# Patient Record
Sex: MALE | Race: WHITE | NOT HISPANIC OR LATINO
[De-identification: names, ages, dates, MRNs, and addresses within clinical notes are randomized per-mention and may not be internally consistent; named-entity substitution may affect disease eponyms.]

---

## 2021-04-12 ENCOUNTER — TRANSCRIPTION ENCOUNTER (OUTPATIENT)
Age: 71
End: 2021-04-12

## 2023-01-31 NOTE — H&P ADULT - ASSESSMENT
72yr old male PMH HTN, CAD, HLD, MI, with c/o SOB on exertion pt. had a cardiac PET-MPI which revealed a moderate to large perfusion abnormality of severe severity is present in the basal anterior mid anterior regions on the stress images, defect is largely reversible. Pt. now referred for [facility] for further evaluation.   Consent obtained for and signed for cardiac cath with coronary angiogram and possible stent placement with possible sedation and analgesia. [facility] risks, benefits and alternatives discussed with patient. Risk discussed included, but not limited to MI, stroke, mortality, major bleeding, arrythmia, or infection, educational material provided. Pt. verbalizes and understands pre-procedural instructions.   ASA:  Bleeding Risk Score:  Creatinine:  GFR:    Pako Score:  Pt. pre-hydrated with 0.9% sodium chloride 250cc bolus IV x1 72yr old male PMH HTN, CAD, HLD, MI, with c/o SOB on exertion pt. had a cardiac PET-MPI which revealed a moderate to large perfusion abnormality of severe severity is present in the basal anterior mid anterior regions on the stress images, defect is largely reversible. Pt. now referred for [facility] for further evaluation.   Consent obtained for and signed for cardiac cath with coronary angiogram and possible stent placement with possible sedation and analgesia. [facility] risks, benefits and alternatives discussed with patient. Risk discussed included, but not limited to MI, stroke, mortality, major bleeding, arrythmia, or infection, educational material provided. Pt. verbalizes and understands pre-procedural instructions.   ASA: II  Bleeding Risk Score: 1.2  Creatinine: 1.27  GFR:  61  Pako Score: DWAYNE risk 1 point  Pt. pre-hydrated with 0.9% sodium chloride 250cc bolus IV x1

## 2023-01-31 NOTE — ASU PATIENT PROFILE, ADULT - FALL HARM RISK - UNIVERSAL INTERVENTIONS
Bed in lowest position, wheels locked, appropriate side rails in place/Call bell, personal items and telephone in reach/Instruct patient to call for assistance before getting out of bed or chair/Non-slip footwear when patient is out of bed/[place] to call system/Physically safe environment - no spills, clutter or unnecessary equipment/Purposeful Proactive Rounding/Room/bathroom lighting operational, light cord in reach

## 2023-01-31 NOTE — H&P ADULT - NSHPLABSRESULTS_GEN_ALL_CORE
Cardiac PET-MPI a moderate to large perfusion abnormality of severe severity is present in the basal anterior mid anterior regions on the stress images. The defect is largely reversible.

## 2023-01-31 NOTE — ASU PATIENT PROFILE, ADULT - NSICDXPASTMEDICALHX_GEN_ALL_CORE_FT
PAST MEDICAL HISTORY:  Acute myocardial infarction     CAD (coronary artery disease)     HLD (hyperlipidemia)     HTN (hypertension)

## 2023-01-31 NOTE — H&P ADULT - NSHPREVIEWOFSYSTEMS_GEN_ALL_CORE
CONSTITUTIONAL: No fever, weight loss, or fatigue  EYES: No eye pain, visual disturbances, or discharge  ENMT:  No difficulty hearing, tinnitus, vertigo; No sinus or throat pain  NECK: No pain or stiffness  BREASTS: No pain, masses, or nipple discharge  RESPIRATORY:+ shortness of breath on exertion  CARDIOVASCULAR: No chest pain, palpitations, dizziness, or leg swelling  GASTROINTESTINAL: No abdominal or epigastric pain. No nausea, vomiting, or hematemesis; No diarrhea or constipation. No melena or hematochezia.  GENITOURINARY: No dysuria, frequency, hematuria, or incontinence  NEUROLOGICAL: No headaches, memory loss, loss of strength, numbness, or tremors  SKIN: No itching, burning, rashes, or lesions   ENDOCRINE: No heat or cold intolerance; No hair loss  MUSCULOSKELETAL: No joint pain or swelling; No muscle, back, or extremity pain  PSYCHIATRIC: No depression, anxiety, mood swings, or difficulty sleeping

## 2023-01-31 NOTE — H&P ADULT - HISTORY OF PRESENT ILLNESS
72yr old male PMH HTN, CAD, HLD, MI,  72yr old male PMH HTN, CAD, HLD, MI, with c/o SOB on exertion pt. had a cardiac PET-MPI which revealed a moderate to large perfusion abnormality of severe severity is present in the basal anterior mid anterior regions on the stress images, defect is largely reversible. Pt. now referred for [facility] for further evaluation.

## 2023-02-01 ENCOUNTER — INPATIENT (INPATIENT)
Facility: HOSPITAL | Age: 73
LOS: 7 days | Discharge: HOME HEALTH | DRG: 235 | End: 2023-02-09
Attending: THORACIC SURGERY (CARDIOTHORACIC VASCULAR SURGERY) | Admitting: THORACIC SURGERY (CARDIOTHORACIC VASCULAR SURGERY)
Payer: MEDICARE

## 2023-02-01 ENCOUNTER — OUTPATIENT (OUTPATIENT)
Dept: OUTPATIENT SERVICES | Facility: HOSPITAL | Age: 73
LOS: 1 days | Discharge: ANOTHER HOSPITAL | End: 2023-02-01
Payer: MEDICARE

## 2023-02-01 ENCOUNTER — TRANSCRIPTION ENCOUNTER (OUTPATIENT)
Age: 73
End: 2023-02-01

## 2023-02-01 VITALS
HEART RATE: 64 BPM | DIASTOLIC BLOOD PRESSURE: 70 MMHG | OXYGEN SATURATION: 98 % | RESPIRATION RATE: 16 BRPM | SYSTOLIC BLOOD PRESSURE: 143 MMHG

## 2023-02-01 VITALS
DIASTOLIC BLOOD PRESSURE: 88 MMHG | OXYGEN SATURATION: 98 % | TEMPERATURE: 98 F | HEART RATE: 62 BPM | RESPIRATION RATE: 15 BRPM | SYSTOLIC BLOOD PRESSURE: 148 MMHG

## 2023-02-01 VITALS
HEIGHT: 67 IN | OXYGEN SATURATION: 99 % | TEMPERATURE: 97 F | WEIGHT: 164.91 LBS | RESPIRATION RATE: 16 BRPM | DIASTOLIC BLOOD PRESSURE: 68 MMHG | SYSTOLIC BLOOD PRESSURE: 151 MMHG | HEART RATE: 63 BPM

## 2023-02-01 DIAGNOSIS — I65.29 OCCLUSION AND STENOSIS OF UNSPECIFIED CAROTID ARTERY: ICD-10-CM

## 2023-02-01 DIAGNOSIS — I25.10 ATHEROSCLEROTIC HEART DISEASE OF NATIVE CORONARY ARTERY WITHOUT ANGINA PECTORIS: ICD-10-CM

## 2023-02-01 DIAGNOSIS — R94.39 ABNORMAL RESULT OF OTHER CARDIOVASCULAR FUNCTION STUDY: ICD-10-CM

## 2023-02-01 DIAGNOSIS — I10 ESSENTIAL (PRIMARY) HYPERTENSION: ICD-10-CM

## 2023-02-01 DIAGNOSIS — E78.5 HYPERLIPIDEMIA, UNSPECIFIED: ICD-10-CM

## 2023-02-01 PROBLEM — Z00.00 ENCOUNTER FOR PREVENTIVE HEALTH EXAMINATION: Status: ACTIVE | Noted: 2023-02-01

## 2023-02-01 LAB
A1C WITH ESTIMATED AVERAGE GLUCOSE RESULT: 6.3 % — HIGH (ref 4–5.6)
ALBUMIN SERPL ELPH-MCNC: 4 G/DL — SIGNIFICANT CHANGE UP (ref 3.3–5.2)
ALP SERPL-CCNC: 79 U/L — SIGNIFICANT CHANGE UP (ref 40–120)
ALT FLD-CCNC: 15 U/L — SIGNIFICANT CHANGE UP
ANION GAP SERPL CALC-SCNC: 8 MMOL/L — SIGNIFICANT CHANGE UP (ref 5–17)
APTT BLD: 30.8 SEC — SIGNIFICANT CHANGE UP (ref 27.5–35.5)
AST SERPL-CCNC: 15 U/L — SIGNIFICANT CHANGE UP
BASOPHILS # BLD AUTO: 0.09 K/UL — SIGNIFICANT CHANGE UP (ref 0–0.2)
BASOPHILS NFR BLD AUTO: 1 % — SIGNIFICANT CHANGE UP (ref 0–2)
BILIRUB SERPL-MCNC: 0.4 MG/DL — SIGNIFICANT CHANGE UP (ref 0.4–2)
BLD GP AB SCN SERPL QL: SIGNIFICANT CHANGE UP
BUN SERPL-MCNC: 22.2 MG/DL — HIGH (ref 8–20)
CALCIUM SERPL-MCNC: 9.2 MG/DL — SIGNIFICANT CHANGE UP (ref 8.4–10.5)
CHLORIDE SERPL-SCNC: 106 MMOL/L — SIGNIFICANT CHANGE UP (ref 96–108)
CO2 SERPL-SCNC: 26 MMOL/L — SIGNIFICANT CHANGE UP (ref 22–29)
CREAT SERPL-MCNC: 1.13 MG/DL — SIGNIFICANT CHANGE UP (ref 0.5–1.3)
EGFR: 70 ML/MIN/1.73M2 — SIGNIFICANT CHANGE UP
EOSINOPHIL # BLD AUTO: 0.13 K/UL — SIGNIFICANT CHANGE UP (ref 0–0.5)
EOSINOPHIL NFR BLD AUTO: 1.5 % — SIGNIFICANT CHANGE UP (ref 0–6)
ESTIMATED AVERAGE GLUCOSE: 134 MG/DL — HIGH (ref 68–114)
GLUCOSE SERPL-MCNC: 102 MG/DL — HIGH (ref 70–99)
HCT VFR BLD CALC: 37.2 % — LOW (ref 39–50)
HGB BLD-MCNC: 12.8 G/DL — LOW (ref 13–17)
IMM GRANULOCYTES NFR BLD AUTO: 0.3 % — SIGNIFICANT CHANGE UP (ref 0–0.9)
INR BLD: 1.02 RATIO — SIGNIFICANT CHANGE UP (ref 0.88–1.16)
LYMPHOCYTES # BLD AUTO: 2.32 K/UL — SIGNIFICANT CHANGE UP (ref 1–3.3)
LYMPHOCYTES # BLD AUTO: 26.2 % — SIGNIFICANT CHANGE UP (ref 13–44)
MCHC RBC-ENTMCNC: 31.2 PG — SIGNIFICANT CHANGE UP (ref 27–34)
MCHC RBC-ENTMCNC: 34.4 GM/DL — SIGNIFICANT CHANGE UP (ref 32–36)
MCV RBC AUTO: 90.7 FL — SIGNIFICANT CHANGE UP (ref 80–100)
MONOCYTES # BLD AUTO: 0.75 K/UL — SIGNIFICANT CHANGE UP (ref 0–0.9)
MONOCYTES NFR BLD AUTO: 8.5 % — SIGNIFICANT CHANGE UP (ref 2–14)
NEUTROPHILS # BLD AUTO: 5.52 K/UL — SIGNIFICANT CHANGE UP (ref 1.8–7.4)
NEUTROPHILS NFR BLD AUTO: 62.5 % — SIGNIFICANT CHANGE UP (ref 43–77)
NT-PROBNP SERPL-SCNC: 105 PG/ML — SIGNIFICANT CHANGE UP (ref 0–300)
PA ADP PRP-ACNC: 157 PRU — LOW (ref 180–376)
PLATELET # BLD AUTO: 260 K/UL — SIGNIFICANT CHANGE UP (ref 150–400)
POTASSIUM SERPL-MCNC: 4.7 MMOL/L — SIGNIFICANT CHANGE UP (ref 3.5–5.3)
POTASSIUM SERPL-SCNC: 4.7 MMOL/L — SIGNIFICANT CHANGE UP (ref 3.5–5.3)
PREALB SERPL-MCNC: 29 MG/DL — SIGNIFICANT CHANGE UP (ref 18–38)
PROT SERPL-MCNC: 6.4 G/DL — LOW (ref 6.6–8.7)
PROTHROM AB SERPL-ACNC: 11.8 SEC — SIGNIFICANT CHANGE UP (ref 10.5–13.4)
RBC # BLD: 4.1 M/UL — LOW (ref 4.2–5.8)
RBC # FLD: 13.2 % — SIGNIFICANT CHANGE UP (ref 10.3–14.5)
SODIUM SERPL-SCNC: 140 MMOL/L — SIGNIFICANT CHANGE UP (ref 135–145)
TSH SERPL-MCNC: 1.13 UIU/ML — SIGNIFICANT CHANGE UP (ref 0.27–4.2)
WBC # BLD: 8.84 K/UL — SIGNIFICANT CHANGE UP (ref 3.8–10.5)
WBC # FLD AUTO: 8.84 K/UL — SIGNIFICANT CHANGE UP (ref 3.8–10.5)

## 2023-02-01 PROCEDURE — 71045 X-RAY EXAM CHEST 1 VIEW: CPT | Mod: 26

## 2023-02-01 PROCEDURE — C1760: CPT

## 2023-02-01 PROCEDURE — 93306 TTE W/DOPPLER COMPLETE: CPT | Mod: 26

## 2023-02-01 PROCEDURE — 93454 CORONARY ARTERY ANGIO S&I: CPT

## 2023-02-01 PROCEDURE — 99222 1ST HOSP IP/OBS MODERATE 55: CPT | Mod: FS

## 2023-02-01 PROCEDURE — 93010 ELECTROCARDIOGRAM REPORT: CPT | Mod: 77

## 2023-02-01 PROCEDURE — 93880 EXTRACRANIAL BILAT STUDY: CPT | Mod: 26

## 2023-02-01 PROCEDURE — C1887: CPT

## 2023-02-01 PROCEDURE — 93005 ELECTROCARDIOGRAM TRACING: CPT | Mod: XU

## 2023-02-01 PROCEDURE — C1894: CPT

## 2023-02-01 PROCEDURE — C1769: CPT

## 2023-02-01 PROCEDURE — 93010 ELECTROCARDIOGRAM REPORT: CPT

## 2023-02-01 RX ORDER — CLOPIDOGREL BISULFATE 75 MG/1
1 TABLET, FILM COATED ORAL
Qty: 0 | Refills: 0 | DISCHARGE

## 2023-02-01 RX ORDER — SODIUM CHLORIDE 9 MG/ML
3 INJECTION INTRAMUSCULAR; INTRAVENOUS; SUBCUTANEOUS EVERY 8 HOURS
Refills: 0 | Status: DISCONTINUED | OUTPATIENT
Start: 2023-02-01 | End: 2023-02-04

## 2023-02-01 RX ORDER — METOPROLOL TARTRATE 50 MG
12.5 TABLET ORAL
Refills: 0 | Status: DISCONTINUED | OUTPATIENT
Start: 2023-02-01 | End: 2023-02-04

## 2023-02-01 RX ORDER — SODIUM CHLORIDE 9 MG/ML
250 INJECTION INTRAMUSCULAR; INTRAVENOUS; SUBCUTANEOUS ONCE
Refills: 0 | Status: COMPLETED | OUTPATIENT
Start: 2023-02-01 | End: 2023-02-01

## 2023-02-01 RX ORDER — CHLORHEXIDINE GLUCONATE 213 G/1000ML
15 SOLUTION TOPICAL
Refills: 0 | Status: DISCONTINUED | OUTPATIENT
Start: 2023-02-01 | End: 2023-02-04

## 2023-02-01 RX ORDER — ATORVASTATIN CALCIUM 80 MG/1
80 TABLET, FILM COATED ORAL AT BEDTIME
Refills: 0 | Status: DISCONTINUED | OUTPATIENT
Start: 2023-02-01 | End: 2023-02-04

## 2023-02-01 RX ORDER — PANTOPRAZOLE SODIUM 20 MG/1
40 TABLET, DELAYED RELEASE ORAL
Refills: 0 | Status: DISCONTINUED | OUTPATIENT
Start: 2023-02-01 | End: 2023-02-04

## 2023-02-01 RX ORDER — DEXTROSE MONOHYDRATE, SODIUM CHLORIDE, AND POTASSIUM CHLORIDE 50; .745; 4.5 G/1000ML; G/1000ML; G/1000ML
1000 INJECTION, SOLUTION INTRAVENOUS
Refills: 0 | Status: DISCONTINUED | OUTPATIENT
Start: 2023-02-01 | End: 2023-02-01

## 2023-02-01 RX ORDER — SODIUM CHLORIDE 9 MG/ML
1000 INJECTION INTRAMUSCULAR; INTRAVENOUS; SUBCUTANEOUS
Refills: 0 | Status: DISCONTINUED | OUTPATIENT
Start: 2023-02-01 | End: 2023-02-01

## 2023-02-01 RX ORDER — CHLORHEXIDINE GLUCONATE 213 G/1000ML
1 SOLUTION TOPICAL
Refills: 0 | Status: DISCONTINUED | OUTPATIENT
Start: 2023-02-01 | End: 2023-02-04

## 2023-02-01 RX ADMIN — PANTOPRAZOLE SODIUM 40 MILLIGRAM(S): 20 TABLET, DELAYED RELEASE ORAL at 17:42

## 2023-02-01 RX ADMIN — CHLORHEXIDINE GLUCONATE 1 APPLICATION(S): 213 SOLUTION TOPICAL at 18:13

## 2023-02-01 RX ADMIN — SODIUM CHLORIDE 500 MILLILITER(S): 9 INJECTION INTRAMUSCULAR; INTRAVENOUS; SUBCUTANEOUS at 07:25

## 2023-02-01 RX ADMIN — CHLORHEXIDINE GLUCONATE 15 MILLILITER(S): 213 SOLUTION TOPICAL at 17:41

## 2023-02-01 RX ADMIN — ATORVASTATIN CALCIUM 80 MILLIGRAM(S): 80 TABLET, FILM COATED ORAL at 22:05

## 2023-02-01 RX ADMIN — Medication 12.5 MILLIGRAM(S): at 17:42

## 2023-02-01 RX ADMIN — SODIUM CHLORIDE 148 MILLILITER(S): 9 INJECTION INTRAMUSCULAR; INTRAVENOUS; SUBCUTANEOUS at 10:00

## 2023-02-01 RX ADMIN — SODIUM CHLORIDE 3 MILLILITER(S): 9 INJECTION INTRAMUSCULAR; INTRAVENOUS; SUBCUTANEOUS at 20:33

## 2023-02-01 RX ADMIN — SODIUM CHLORIDE 3 MILLILITER(S): 9 INJECTION INTRAMUSCULAR; INTRAVENOUS; SUBCUTANEOUS at 16:59

## 2023-02-01 NOTE — PROGRESS NOTE ADULT - SUBJECTIVE AND OBJECTIVE BOX
s/p LHC Denies chest pain, shortness of breath, dizziness or palpitations post procedure    PHYSICAL EXAM:    Constitutional: NAD  Neuro: Alert and oriented x 3 Speech clear No focal deficits  Neck: No JVD   Respiratory: CTAB  Cardiovascular: S1 and S2, RRR,   Gastrointestinal: BS+, soft, NT/ND  Extremities: No clubbing cyanosis or edema No varicosities  Vascular: 2+ peripheral pulses  Psychiatric: Normal mood, normal affect  Musculoskeletal: 5/5 strength b/l upper and lower extremities  Right groin procedure site with Mynx ;no bleeding, no hematoma, site soft, non tender, positive pedal pulses bilaterally      T(C): 36.2 (01 Feb 2023 06:48), Max: 36.2 (01 Feb 2023 06:48)  T(F): 97.1 (01 Feb 2023 06:48), Max: 97.1 (01 Feb 2023 06:48)  HR: 64 (01 Feb 2023 10:15) (63 - 65)  BP: 143/70 (01 Feb 2023 10:15) (141/99 - 151/73)  RR: 16 (01 Feb 2023 10:15) (16 - 16)  SpO2: 98% (01 Feb 2023 10:15) (98% - 99%)    Parameters below as of 01 Feb 2023 10:45  Patient On (Oxygen Delivery Method): room air        HPI:  72yr old male PMH HTN, CAD, HLD, MI, with c/o SOB on exertion pt. had a cardiac PET-MPI which revealed a moderate to large perfusion abnormality of severe severity is present in the basal anterior mid anterior regions on the stress images, defect is largely reversible. Pt. now referred for [facility] for further evaluation.  (31 Jan 2023 14:14)    now s/p [facility] revealing LM, LCX and LAD stenoses  Transfer to [facility] for CTS evalution/CABG by Dr Doe    -iv hydration for DWAYNE prevention  -encourage PO fluids  -plan of care discussed with patient and MD  -d/c after bedrest if stable  -post procedure instructions reviewed  -follow up with MD in 1-2 weeks  -Discussed therapeutic lifestyle changes to reduce risk factors such as following a cardiac diet, weight loss, maintaining a healthy weight, exercise, smoking cessation, medication compliance, and regular follow-up  with MD

## 2023-02-01 NOTE — H&P ADULT - HISTORY OF PRESENT ILLNESS
72yr old male PMH HTN, CAD (s/p MI in 2016 with PCI to d RCA), HLD,   Pt reports SOB on exertion, for which he underwent a cardiac PET-MPI that revealed a moderate to large perfusion abnormality in the basal anterior mid anterior regions on the stress images,   He underwent elective cardiac cath today at [facility] that showed Triple vessel CAD (85% Mlad, 90% d1, 75% Ostial Lcx, and 65%LM .  Patent dRCA stent).  Patient was transferred to [facility] for sugical evaluation.

## 2023-02-01 NOTE — DISCHARGE NOTE PROVIDER - HOSPITAL COURSE
72yr old male PMH HTN, CAD, HLD, MI, with c/o SOB on exertion pt. had a cardiac PET-MPI which revealed a moderate to large perfusion abnormality of severe severity is present in the basal anterior mid anterior regions on the stress images, defect is largely reversible.  S/P LHC which revealed LM, LAD and LCx stenoses. Pt will be transferred to [facility] for CTS evaluation for CABG by Dr Doe     T(C): 36.2 (01 Feb 2023 06:48), Max: 36.2 (01 Feb 2023 06:48)  T(F): 97.1 (01 Feb 2023 06:48), Max: 97.1 (01 Feb 2023 06:48)  HR: 63 (01 Feb 2023 09:45) (63 - 65)  BP: 144/71 (01 Feb 2023 09:45) (141/99 - 151/73)  BP(mean): --  RR: 16 (01 Feb 2023 09:45) (16 - 16)  SpO2: 99% (01 Feb 2023 09:45) (98% - 99%)    Parameters below as of 01 Feb 2023 09:45  Patient On (Oxygen Delivery Method): room air

## 2023-02-01 NOTE — DISCHARGE NOTE PROVIDER - CARE PROVIDER_API CALL
Isaias Berrios (MD)  Cardiovascular Disease; Interventional Cardiology  [address] [place] [address]  Phone: [phone]  Fax: [phone]  Follow Up Time: 2 weeks

## 2023-02-01 NOTE — H&P ADULT - ASSESSMENT
72yr old male PMH HTN, CAD (s/p MI in 2016 with PCI to d RCA), HLD,   Pt reports SOB on exertion, for which he underwent a cardiac PET-MPI that revealed a moderate to large perfusion abnormality in the basal anterior mid anterior regions on the stress images,   He underwent elective cardiac cath today at [facility] that showed Triple vessel CAD (85% Mlad, 90% d1, 75% Ostial Lcx, and 65%LM .  Patent dRCA stent).  Patient was transferred to [facility] for sugical evaluation.

## 2023-02-01 NOTE — H&P ADULT - NSHPPHYSICALEXAM_GEN_ALL_CORE
General:  NAD  Neurology: A&Ox3, nonfocal, BELL x 4  Respiratory: CTA B/L  CV: RRR, S1S2.  Abdominal: Soft, NT, ND +BS  Extremities: No edema, + peripheral pulses

## 2023-02-01 NOTE — CONSULT NOTE ADULT - ASSESSMENT
71 y/o male with a past MHx of HTN, CAD, MI (2016), Percutaneous Angioplasty to the right coronary artery transferred to the hospital from [place] to undergo heart surgery for Triple vessel CAD, presurgical work up reveal asymptomatic left ICA stenosis

## 2023-02-01 NOTE — DISCHARGE NOTE PROVIDER - NSDCCPCAREPLAN_GEN_ALL_CORE_FT
PRINCIPAL DISCHARGE DIAGNOSIS  Diagnosis: CAD (coronary artery disease)  Assessment and Plan of Treatment: Transfer to [facility] for CABG

## 2023-02-01 NOTE — H&P ADULT - PROBLEM SELECTOR PLAN 1
S/P elective cath at  that showed triple vessel CAD.  Admit to Dr. Doe ct surgery.  Preop workup ordered including carotid US, TTE, Labs, PFT's, UA.  Check P2y12.  (pt was on Plavix at home with hx of PCI 2016).  Continue Lipitor.  Defer ASA and Plavix with plan for possible surgery.  Will add beta blocker if able to tolerate.  DASH/TLC diet.  Discussed with Dr. Doe.

## 2023-02-01 NOTE — DISCHARGE NOTE PROVIDER - NSDCCPTREATMENT_GEN_ALL_CORE_FT
PRINCIPAL PROCEDURE  Procedure: Left heart cardiac cath  Findings and Treatment: LM, LAD and LCx stenoses

## 2023-02-01 NOTE — PACU DISCHARGE NOTE - COMMENTS
Pt is alert and oriented x 4, has no c/o chest pain or SOB. vs baseline, right Groin with tegaderm dsg intact, no s/s bleeding or hematoma.  IV in right hand with NS infusing at 148ml/hour.  Pt transferred to [facility] in Stable condition. Report given to Esthela on 4T.

## 2023-02-01 NOTE — CONSULT NOTE ADULT - SUBJECTIVE AND OBJECTIVE BOX
Vascular Attending:  Allison FUENTES    Vascular Surgery HPI:  Admission HPI reviewed    73 y/o male with a past MHx of HTN, CAD, MI (2016), Percutaneous Angioplasty to the right coronary artery transferred to the hospital from [place] to undergo heart surgery for Triple vessel CAD. Duplex shows > 70% left carotid artery stenosis. Denies a history of CVA, dizziness, HA, weakness, and syncope; Endorses smoking since he was 11, but stopped in 2016, and alcoholism.   CT surgery requesting evaluation for Left ICA asymptomatic stenosis     HPI:  72yr old male PMH HTN, CAD (s/p MI in 2016 with PCI to d RCA), HLD,   Pt reports SOB on exertion, for which he underwent a cardiac PET-MPI that revealed a moderate to large perfusion abnormality in the basal anterior mid anterior regions on the stress images,   He underwent elective cardiac cath today at [facility] that showed Triple vessel CAD (85% Mlad, 90% d1, 75% Ostial Lcx, and 65%LM .  Patent dRCA stent).  Patient was transferred to [facility] for sugical evaluation. (01 Feb 2023 11:56)      PAST MEDICAL & SURGICAL HISTORY:  HTN (hypertension)      HLD (hyperlipidemia)      CAD (coronary artery disease)      Acute myocardial infarction          REVIEW OF SYSTEMS :  see HPI       General: 	    Respiratory and Thorax: SOB   	  MEDICATIONS  (STANDING):  atorvastatin 80 milliGRAM(s) Oral at bedtime  chlorhexidine 0.12% Liquid 15 milliLiter(s) Oral Mucosa two times a day  chlorhexidine 4% Liquid 1 Application(s) Topical two times a day  metoprolol tartrate 12.5 milliGRAM(s) Oral two times a day  pantoprazole    Tablet 40 milliGRAM(s) Oral before breakfast  sodium chloride 0.9% lock flush 3 milliLiter(s) IV Push every 8 hours    MEDICATIONS  (PRN):      Allergies    No Known Allergies; Denies allergies to contrast     Intolerances        SOCIAL HISTORY:  Smoked cigarettes (1 pack/day) since he was 12 y/o; reported alcoholism ; Worked as a  for 37 years     Vital Signs Last 24 Hrs  T(C): 36.5 (01 Feb 2023 16:40), Max: 36.7 (01 Feb 2023 11:42)  T(F): 97.7 (01 Feb 2023 16:40), Max: 98 (01 Feb 2023 11:42)  HR: 65 (01 Feb 2023 16:40) (62 - 65)  BP: 157/76 (01 Feb 2023 16:40) (141/99 - 157/76)  BP(mean): --  RR: 16 (01 Feb 2023 16:40) (15 - 16)  SpO2: 96% (01 Feb 2023 16:40) (96% - 99%)    Parameters below as of 01 Feb 2023 16:40  Patient On (Oxygen Delivery Method): room air        PHYSICAL EXAM:      Constitutional: AAOx3; NAD     Eyes: EOMI, no scleral icterus     Neck: No JVD, no access catheters     Pulm:  non labored breathing     Abdomen: No gross distention     Extremities: +5/5 strength b/l in UE and LE     Vascular/Pulses: DP + PT pulses present and equal b/l     Neurological: CN VII and CN IX intact     Right:                                                                          Left:  FEM [ ]2+ [ ]1+ [ ]doppler                                             FEM [ ]2+ [ ]1+ [ ]doppler    POP [x ]2+ [ ]1+ [ ]doppler                                             POP [x]2+ [ ]1+ [ ]doppler    DP [ x]2+ [ ]1+ [ ]doppler                                                DP [x ]2+ [ ]1+ [ ]doppler  PT[x ]2+ [ ]1+ [ ]doppler                                                  PT [x ]2+ [ ]1+ [ ]doppler      LABS:                        12.8   8.84  )-----------( 260      ( 01 Feb 2023 12:50 )             37.2     02-01    140  |  106  |  22.2<H>  ----------------------------<  102<H>  4.7   |  26.0  |  1.13    Ca    9.2      01 Feb 2023 12:50    TPro  6.4<L>  /  Alb  4.0  /  TBili  0.4  /  DBili  x   /  AST  15  /  ALT  15  /  AlkPhos  79  02-01    PT/INR - ( 01 Feb 2023 12:50 )   PT: 11.8 sec;   INR: 1.02 ratio         PTT - ( 01 Feb 2023 12:50 )  PTT:30.8 sec      RADIOLOGY & ADDITIONAL STUDIES    < from: US Duplex Carotid Arteries Complete, Bilateral (02.01.23 @ 14:09) >    ACC: [id number] EXAM:  US DPLX CAROTIDS COMPL BI   ORDERED BY: ADRIAN CARRILLO     PROCEDURE DATE:  02/01/2023          INTERPRETATION:  CLINICAL INFORMATION: Preop medical clearance for open   heart surgery    COMPARISON: None available.    TECHNIQUE: Grayscale, color and spectral Doppler examination of both   carotid arteries was performed.    FINDINGS:    Elevated velocities are encountered in the left internal and external   carotid arteries. Bilateral heterogeneous plaque noted.    Peak systolic velocities are as follows:    RIGHT:  PROX CCA = 87 cm/s  DIST CCA = 74 cm/s  PROX ICA = 85 cm/s  DIST ICA = 102 cm/s  ECA = 110 cm/s    LEFT:  PROX CCA = 96 cm/s  DIST CCA = 74 cm/s  PROX ICA = 239 cm/s  DIST ICA = 84 cm/s  ECA = 203 cm/s    Antegrade flow is noted within both vertebral arteries.    IMPRESSION: Greater than 70% left internal carotid artery stenosis.    Measurement of carotid stenosis is based on velocity parameters that   correlate the residual internal carotid diameter with that of the more   distal vessel in accordance with a method such as the [place] American   Symptomatic Carotid Endarterectomy Trial (NASCET).    --- End of Report ---            VINAYAK NOVOA MD; Attending Radiologist  This document has been electronically signed. Feb 1 2023  3:34PM    < end of copied text >      Impression and Plan:

## 2023-02-01 NOTE — DISCHARGE NOTE PROVIDER - NSDCMRMEDTOKEN_GEN_ALL_CORE_FT
aspirin 81 mg oral delayed release tablet: 1 tab(s) orally once a day  atorvastatin 40 mg oral tablet: 1 tab(s) orally once a day  losartan 25 mg oral tablet: 1 tab(s) orally once a day

## 2023-02-01 NOTE — CONSULT NOTE ADULT - PROBLEM SELECTOR RECOMMENDATION 9
Asymptomatic currently. No immediate intervention warranted at this time   - CTA Neck recommended if not significant  will need follow-up duplex outpatient post discharge.   - will follow while in house

## 2023-02-01 NOTE — H&P ADULT - NSHPREVIEWOFSYSTEMS_GEN_ALL_CORE
Review of Systems  GENERAL:  Fevers[] chills[] sweats[] fatigue[] weight loss[] weight gain []                                      [ ] NEGATIVE  NEURO:  parathesias[] seizures []  syncope []  confusion []                                                                [ ] NEGATIVE                 EYES: glasses[]  blurry vision[]  discharge[] pain[] glaucoma []                                                           [ ] NEGATIVE                 ENMT:  difficulty hearing []  vertigo[]  dysphagia[] epistaxis[] recent dental work []                     [ ] NEGATIVE                 CV:  chest pain[] palpitations[] JESSICA [] diaphoresis [] edema[]                                                           [ ] NEGATIVE                                 RESPIRATORY:  wheezing[] SOB[] cough [] sputum[] hemoptysis[]                                                   [ ] NEGATIVE               GI:  nausea[]  vomiting []  diarrhea[] constipation [] melena []                                                          [ ] NEGATIVE            : hematuria[ ]  dysuria[ ] urgency[] incontinence[]                                                                          [ ] NEGATIVE                    MUSKULOSKELETAL:  arthritis[ ]  joint swelling [ ] muscle weakness [ ]                                            [ ] NEGATIVE                     SKIN/BREAST:  rash[ ] itching [ ]  hair loss[ ] masses[ ]                                                                          [ ] NEGATIVE                     PSYCH:  dementia [ ] depresion [ ] anxiety[ ]                                                                                          [ ] NEGATIVE                        HEME/LYMPH:  bruises easily[ ] enlarged lymph nodes[ ] tender lymph nodes[ ]                           [ ] NEGATIVE                      ENDOCRINE:  cold intolerance[ ] heat intolerance[ ] polydipsia[ ]                                                      [ ] NEGATIVE Review of Systems  GENERAL:  Fevers[] chills[] sweats[] fatigue[] weight loss[] weight gain []                                      [ x] NEGATIVE  NEURO:  parathesias[] seizures []  syncope []  confusion []                                                                [x ] NEGATIVE                 EYES: glasses[]  blurry vision[]  discharge[] pain[] glaucoma []                                                           [x ] NEGATIVE                 ENMT:  difficulty hearing []  vertigo[]  dysphagia[] epistaxis[] recent dental work []                     [ ] NEGATIVE                 CV:  chest pain[] palpitations[] JESSICA [x] diaphoresis [] edema[]                                                           [ ] NEGATIVE                                 RESPIRATORY:  wheezing[] SOB[] cough [] sputum[] hemoptysis[]                                                   [x ] NEGATIVE               GI:  nausea[]  vomiting []  diarrhea[] constipation [] melena []                                                          [x ] NEGATIVE            : hematuria[ ]  dysuria[ ] urgency[] incontinence[]                                                                          [x ] NEGATIVE                    MUSKULOSKELETAL:  arthritis[ ]  joint swelling [ ] muscle weakness [ ]                                            [x ] NEGATIVE                     SKIN/BREAST:  rash[ ] itching [ ]  hair loss[ ] masses[ ]                                                                          [x ] NEGATIVE                     PSYCH:  dementia [ ] depresion [ ] anxiety[ ]                                                                                          [x ] NEGATIVE                        HEME/LYMPH:  bruises easily[ ] enlarged lymph nodes[ ] tender lymph nodes[ ]                           [ x] NEGATIVE                      ENDOCRINE:  cold intolerance[ ] heat intolerance[ ] polydipsia[ ]                                                      [ x] NEGATIVE

## 2023-02-02 DIAGNOSIS — I10 ESSENTIAL (PRIMARY) HYPERTENSION: ICD-10-CM

## 2023-02-02 DIAGNOSIS — I25.110 ATHEROSCLEROTIC HEART DISEASE OF NATIVE CORONARY ARTERY WITH UNSTABLE ANGINA PECTORIS: ICD-10-CM

## 2023-02-02 DIAGNOSIS — Z95.5 PRESENCE OF CORONARY ANGIOPLASTY IMPLANT AND GRAFT: ICD-10-CM

## 2023-02-02 DIAGNOSIS — Z29.9 ENCOUNTER FOR PROPHYLACTIC MEASURES, UNSPECIFIED: ICD-10-CM

## 2023-02-02 PROBLEM — E78.5 HYPERLIPIDEMIA, UNSPECIFIED: Chronic | Status: ACTIVE | Noted: 2023-01-31

## 2023-02-02 PROBLEM — I25.10 ATHEROSCLEROTIC HEART DISEASE OF NATIVE CORONARY ARTERY WITHOUT ANGINA PECTORIS: Chronic | Status: ACTIVE | Noted: 2023-01-31

## 2023-02-02 PROBLEM — I21.9 ACUTE MYOCARDIAL INFARCTION, UNSPECIFIED: Chronic | Status: ACTIVE | Noted: 2023-01-31

## 2023-02-02 LAB
ALBUMIN SERPL ELPH-MCNC: 4.1 G/DL — SIGNIFICANT CHANGE UP (ref 3.3–5.2)
ALP SERPL-CCNC: 79 U/L — SIGNIFICANT CHANGE UP (ref 40–120)
ALT FLD-CCNC: 17 U/L — SIGNIFICANT CHANGE UP
ANION GAP SERPL CALC-SCNC: 11 MMOL/L — SIGNIFICANT CHANGE UP (ref 5–17)
APPEARANCE UR: CLEAR — SIGNIFICANT CHANGE UP
AST SERPL-CCNC: 18 U/L — SIGNIFICANT CHANGE UP
BASOPHILS # BLD AUTO: 0.1 K/UL — SIGNIFICANT CHANGE UP (ref 0–0.2)
BASOPHILS NFR BLD AUTO: 1.1 % — SIGNIFICANT CHANGE UP (ref 0–2)
BILIRUB SERPL-MCNC: 0.6 MG/DL — SIGNIFICANT CHANGE UP (ref 0.4–2)
BILIRUB UR-MCNC: NEGATIVE — SIGNIFICANT CHANGE UP
BUN SERPL-MCNC: 20 MG/DL — SIGNIFICANT CHANGE UP (ref 8–20)
CALCIUM SERPL-MCNC: 9.6 MG/DL — SIGNIFICANT CHANGE UP (ref 8.4–10.5)
CHLORIDE SERPL-SCNC: 105 MMOL/L — SIGNIFICANT CHANGE UP (ref 96–108)
CO2 SERPL-SCNC: 21 MMOL/L — LOW (ref 22–29)
COLOR SPEC: YELLOW — SIGNIFICANT CHANGE UP
CREAT SERPL-MCNC: 1.21 MG/DL — SIGNIFICANT CHANGE UP (ref 0.5–1.3)
DIFF PNL FLD: NEGATIVE — SIGNIFICANT CHANGE UP
EGFR: 64 ML/MIN/1.73M2 — SIGNIFICANT CHANGE UP
EOSINOPHIL # BLD AUTO: 0.16 K/UL — SIGNIFICANT CHANGE UP (ref 0–0.5)
EOSINOPHIL NFR BLD AUTO: 1.7 % — SIGNIFICANT CHANGE UP (ref 0–6)
EPI CELLS # UR: SIGNIFICANT CHANGE UP
GLUCOSE SERPL-MCNC: 94 MG/DL — SIGNIFICANT CHANGE UP (ref 70–99)
GLUCOSE UR QL: NEGATIVE MG/DL — SIGNIFICANT CHANGE UP
HCT VFR BLD CALC: 40.5 % — SIGNIFICANT CHANGE UP (ref 39–50)
HCV AB S/CO SERPL IA: 0.05 S/CO — SIGNIFICANT CHANGE UP (ref 0–0.99)
HCV AB SERPL-IMP: SIGNIFICANT CHANGE UP
HGB BLD-MCNC: 13.7 G/DL — SIGNIFICANT CHANGE UP (ref 13–17)
IMM GRANULOCYTES NFR BLD AUTO: 0.3 % — SIGNIFICANT CHANGE UP (ref 0–0.9)
KETONES UR-MCNC: ABNORMAL
LEUKOCYTE ESTERASE UR-ACNC: ABNORMAL
LYMPHOCYTES # BLD AUTO: 2.64 K/UL — SIGNIFICANT CHANGE UP (ref 1–3.3)
LYMPHOCYTES # BLD AUTO: 28.3 % — SIGNIFICANT CHANGE UP (ref 13–44)
MCHC RBC-ENTMCNC: 30.6 PG — SIGNIFICANT CHANGE UP (ref 27–34)
MCHC RBC-ENTMCNC: 33.8 GM/DL — SIGNIFICANT CHANGE UP (ref 32–36)
MCV RBC AUTO: 90.4 FL — SIGNIFICANT CHANGE UP (ref 80–100)
MONOCYTES # BLD AUTO: 0.78 K/UL — SIGNIFICANT CHANGE UP (ref 0–0.9)
MONOCYTES NFR BLD AUTO: 8.4 % — SIGNIFICANT CHANGE UP (ref 2–14)
MRSA PCR RESULT.: SIGNIFICANT CHANGE UP
NEUTROPHILS # BLD AUTO: 5.62 K/UL — SIGNIFICANT CHANGE UP (ref 1.8–7.4)
NEUTROPHILS NFR BLD AUTO: 60.2 % — SIGNIFICANT CHANGE UP (ref 43–77)
NITRITE UR-MCNC: NEGATIVE — SIGNIFICANT CHANGE UP
PA ADP PRP-ACNC: 164 PRU — LOW (ref 180–376)
PH UR: 5 — SIGNIFICANT CHANGE UP (ref 5–8)
PLATELET # BLD AUTO: 302 K/UL — SIGNIFICANT CHANGE UP (ref 150–400)
POTASSIUM SERPL-MCNC: 4.1 MMOL/L — SIGNIFICANT CHANGE UP (ref 3.5–5.3)
POTASSIUM SERPL-SCNC: 4.1 MMOL/L — SIGNIFICANT CHANGE UP (ref 3.5–5.3)
PROT SERPL-MCNC: 6.6 G/DL — SIGNIFICANT CHANGE UP (ref 6.6–8.7)
PROT UR-MCNC: NEGATIVE — SIGNIFICANT CHANGE UP
RBC # BLD: 4.48 M/UL — SIGNIFICANT CHANGE UP (ref 4.2–5.8)
RBC # FLD: 13.2 % — SIGNIFICANT CHANGE UP (ref 10.3–14.5)
RBC CASTS # UR COMP ASSIST: NEGATIVE /HPF — SIGNIFICANT CHANGE UP (ref 0–4)
S AUREUS DNA NOSE QL NAA+PROBE: SIGNIFICANT CHANGE UP
SODIUM SERPL-SCNC: 137 MMOL/L — SIGNIFICANT CHANGE UP (ref 135–145)
SP GR SPEC: 1.02 — SIGNIFICANT CHANGE UP (ref 1.01–1.02)
UROBILINOGEN FLD QL: NEGATIVE MG/DL — SIGNIFICANT CHANGE UP
WBC # BLD: 9.33 K/UL — SIGNIFICANT CHANGE UP (ref 3.8–10.5)
WBC # FLD AUTO: 9.33 K/UL — SIGNIFICANT CHANGE UP (ref 3.8–10.5)
WBC UR QL: SIGNIFICANT CHANGE UP /HPF (ref 0–5)

## 2023-02-02 PROCEDURE — 70498 CT ANGIOGRAPHY NECK: CPT | Mod: 26

## 2023-02-02 PROCEDURE — 99232 SBSQ HOSP IP/OBS MODERATE 35: CPT | Mod: FS

## 2023-02-02 RX ORDER — INFLUENZA VIRUS VACCINE 15; 15; 15; 15 UG/.5ML; UG/.5ML; UG/.5ML; UG/.5ML
0.7 SUSPENSION INTRAMUSCULAR ONCE
Refills: 0 | Status: COMPLETED | OUTPATIENT
Start: 2023-02-02 | End: 2023-02-02

## 2023-02-02 RX ADMIN — SODIUM CHLORIDE 3 MILLILITER(S): 9 INJECTION INTRAMUSCULAR; INTRAVENOUS; SUBCUTANEOUS at 21:53

## 2023-02-02 RX ADMIN — CHLORHEXIDINE GLUCONATE 15 MILLILITER(S): 213 SOLUTION TOPICAL at 05:21

## 2023-02-02 RX ADMIN — Medication 12.5 MILLIGRAM(S): at 17:01

## 2023-02-02 RX ADMIN — SODIUM CHLORIDE 3 MILLILITER(S): 9 INJECTION INTRAMUSCULAR; INTRAVENOUS; SUBCUTANEOUS at 12:43

## 2023-02-02 RX ADMIN — PANTOPRAZOLE SODIUM 40 MILLIGRAM(S): 20 TABLET, DELAYED RELEASE ORAL at 05:16

## 2023-02-02 RX ADMIN — SODIUM CHLORIDE 3 MILLILITER(S): 9 INJECTION INTRAMUSCULAR; INTRAVENOUS; SUBCUTANEOUS at 04:15

## 2023-02-02 RX ADMIN — CHLORHEXIDINE GLUCONATE 1 APPLICATION(S): 213 SOLUTION TOPICAL at 18:53

## 2023-02-02 RX ADMIN — ATORVASTATIN CALCIUM 80 MILLIGRAM(S): 80 TABLET, FILM COATED ORAL at 21:58

## 2023-02-02 RX ADMIN — CHLORHEXIDINE GLUCONATE 1 APPLICATION(S): 213 SOLUTION TOPICAL at 09:37

## 2023-02-02 RX ADMIN — CHLORHEXIDINE GLUCONATE 15 MILLILITER(S): 213 SOLUTION TOPICAL at 17:02

## 2023-02-02 RX ADMIN — Medication 12.5 MILLIGRAM(S): at 05:16

## 2023-02-02 NOTE — PATIENT PROFILE ADULT - BILL PAYMENT
RX auth received for refill for:  montelukast (SINGULAIR) 10 MG tablet 90 tablet 1 7/13/2021     Sig: TAKE 1 TABLET BY MOUTH AT  NIGHT    Sent to pharmacy as: Montelukast Sodium 10 MG Oral Tablet (SINGULAIR)    Class: Eprescribe            Last filled: 7/13/21  Qty: 90; 1  Last seen:  1/14/21  Next visit: 1/18/22    Refilled per protocol.   no

## 2023-02-02 NOTE — PATIENT PROFILE ADULT - FALL HARM RISK - UNIVERSAL INTERVENTIONS
Bed in lowest position, wheels locked, appropriate side rails in place/Call bell, personal items and telephone in reach/Instruct patient to call for assistance before getting out of bed or chair/Non-slip footwear when patient is out of bed/[place] to call system/Physically safe environment - no spills, clutter or unnecessary equipment/Purposeful Proactive Rounding/Room/bathroom lighting operational, light cord in reach

## 2023-02-03 LAB
ANION GAP SERPL CALC-SCNC: 12 MMOL/L — SIGNIFICANT CHANGE UP (ref 5–17)
BLD GP AB SCN SERPL QL: SIGNIFICANT CHANGE UP
BUN SERPL-MCNC: 27.2 MG/DL — HIGH (ref 8–20)
CALCIUM SERPL-MCNC: 9.3 MG/DL — SIGNIFICANT CHANGE UP (ref 8.4–10.5)
CHLORIDE SERPL-SCNC: 106 MMOL/L — SIGNIFICANT CHANGE UP (ref 96–108)
CO2 SERPL-SCNC: 22 MMOL/L — SIGNIFICANT CHANGE UP (ref 22–29)
CREAT SERPL-MCNC: 1.35 MG/DL — HIGH (ref 0.5–1.3)
EGFR: 56 ML/MIN/1.73M2 — LOW
GLUCOSE SERPL-MCNC: 104 MG/DL — HIGH (ref 70–99)
HCT VFR BLD CALC: 39.2 % — SIGNIFICANT CHANGE UP (ref 39–50)
HGB BLD-MCNC: 13.2 G/DL — SIGNIFICANT CHANGE UP (ref 13–17)
MAGNESIUM SERPL-MCNC: 2.1 MG/DL — SIGNIFICANT CHANGE UP (ref 1.6–2.6)
MCHC RBC-ENTMCNC: 30.6 PG — SIGNIFICANT CHANGE UP (ref 27–34)
MCHC RBC-ENTMCNC: 33.7 GM/DL — SIGNIFICANT CHANGE UP (ref 32–36)
MCV RBC AUTO: 91 FL — SIGNIFICANT CHANGE UP (ref 80–100)
PA ADP PRP-ACNC: 214 PRU — SIGNIFICANT CHANGE UP (ref 180–376)
PLATELET # BLD AUTO: 267 K/UL — SIGNIFICANT CHANGE UP (ref 150–400)
POTASSIUM SERPL-MCNC: 4.4 MMOL/L — SIGNIFICANT CHANGE UP (ref 3.5–5.3)
POTASSIUM SERPL-SCNC: 4.4 MMOL/L — SIGNIFICANT CHANGE UP (ref 3.5–5.3)
RBC # BLD: 4.31 M/UL — SIGNIFICANT CHANGE UP (ref 4.2–5.8)
RBC # FLD: 13.2 % — SIGNIFICANT CHANGE UP (ref 10.3–14.5)
SARS-COV-2 RNA SPEC QL NAA+PROBE: SIGNIFICANT CHANGE UP
SODIUM SERPL-SCNC: 140 MMOL/L — SIGNIFICANT CHANGE UP (ref 135–145)
WBC # BLD: 9.27 K/UL — SIGNIFICANT CHANGE UP (ref 3.8–10.5)
WBC # FLD AUTO: 9.27 K/UL — SIGNIFICANT CHANGE UP (ref 3.8–10.5)

## 2023-02-03 PROCEDURE — 99232 SBSQ HOSP IP/OBS MODERATE 35: CPT | Mod: FS,57

## 2023-02-03 RX ORDER — CEFUROXIME AXETIL 250 MG
1500 TABLET ORAL ONCE
Refills: 0 | Status: DISCONTINUED | OUTPATIENT
Start: 2023-02-03 | End: 2023-02-04

## 2023-02-03 RX ORDER — VANCOMYCIN HCL 1 G
1000 VIAL (EA) INTRAVENOUS ONCE
Refills: 0 | Status: DISCONTINUED | OUTPATIENT
Start: 2023-02-03 | End: 2023-02-04

## 2023-02-03 RX ADMIN — SODIUM CHLORIDE 3 MILLILITER(S): 9 INJECTION INTRAMUSCULAR; INTRAVENOUS; SUBCUTANEOUS at 21:22

## 2023-02-03 RX ADMIN — SODIUM CHLORIDE 3 MILLILITER(S): 9 INJECTION INTRAMUSCULAR; INTRAVENOUS; SUBCUTANEOUS at 05:16

## 2023-02-03 RX ADMIN — CHLORHEXIDINE GLUCONATE 1 APPLICATION(S): 213 SOLUTION TOPICAL at 17:47

## 2023-02-03 RX ADMIN — ATORVASTATIN CALCIUM 80 MILLIGRAM(S): 80 TABLET, FILM COATED ORAL at 21:28

## 2023-02-03 RX ADMIN — CHLORHEXIDINE GLUCONATE 15 MILLILITER(S): 213 SOLUTION TOPICAL at 05:07

## 2023-02-03 RX ADMIN — CHLORHEXIDINE GLUCONATE 15 MILLILITER(S): 213 SOLUTION TOPICAL at 17:46

## 2023-02-03 RX ADMIN — Medication 12.5 MILLIGRAM(S): at 17:46

## 2023-02-03 RX ADMIN — CHLORHEXIDINE GLUCONATE 1 APPLICATION(S): 213 SOLUTION TOPICAL at 08:45

## 2023-02-03 RX ADMIN — PANTOPRAZOLE SODIUM 40 MILLIGRAM(S): 20 TABLET, DELAYED RELEASE ORAL at 08:41

## 2023-02-03 RX ADMIN — Medication 12.5 MILLIGRAM(S): at 05:08

## 2023-02-03 RX ADMIN — SODIUM CHLORIDE 3 MILLILITER(S): 9 INJECTION INTRAMUSCULAR; INTRAVENOUS; SUBCUTANEOUS at 16:11

## 2023-02-04 ENCOUNTER — APPOINTMENT (OUTPATIENT)
Dept: CARDIOTHORACIC SURGERY | Facility: HOSPITAL | Age: 73
End: 2023-02-04

## 2023-02-04 LAB
ALBUMIN SERPL ELPH-MCNC: 2.8 G/DL — LOW (ref 3.3–5.2)
ALP SERPL-CCNC: 45 U/L — SIGNIFICANT CHANGE UP (ref 40–120)
ALT FLD-CCNC: 13 U/L — SIGNIFICANT CHANGE UP
ANION GAP SERPL CALC-SCNC: 11 MMOL/L — SIGNIFICANT CHANGE UP (ref 5–17)
APTT BLD: 28.1 SEC — SIGNIFICANT CHANGE UP (ref 27.5–35.5)
AST SERPL-CCNC: 39 U/L — SIGNIFICANT CHANGE UP
BASE EXCESS BLDA CALC-SCNC: -0.3 MMOL/L — SIGNIFICANT CHANGE UP (ref -2–3)
BASE EXCESS BLDA CALC-SCNC: -0.5 MMOL/L — SIGNIFICANT CHANGE UP (ref -2–3)
BASE EXCESS BLDA CALC-SCNC: -2.6 MMOL/L — LOW (ref -2–3)
BASE EXCESS BLDA CALC-SCNC: 0.8 MMOL/L — SIGNIFICANT CHANGE UP (ref -2–3)
BASE EXCESS BLDA CALC-SCNC: 1.2 MMOL/L — SIGNIFICANT CHANGE UP (ref -2–3)
BASE EXCESS BLDA CALC-SCNC: 4.7 MMOL/L — HIGH (ref -2–3)
BASE EXCESS BLDV CALC-SCNC: -0.2 MMOL/L — SIGNIFICANT CHANGE UP (ref -2–3)
BASE EXCESS BLDV CALC-SCNC: -0.6 MMOL/L — SIGNIFICANT CHANGE UP (ref -2–3)
BASE EXCESS BLDV CALC-SCNC: 0 MMOL/L — SIGNIFICANT CHANGE UP (ref -2–3)
BASOPHILS # BLD AUTO: 0.26 K/UL — HIGH (ref 0–0.2)
BASOPHILS NFR BLD AUTO: 0.9 % — SIGNIFICANT CHANGE UP (ref 0–2)
BILIRUB SERPL-MCNC: 0.5 MG/DL — SIGNIFICANT CHANGE UP (ref 0.4–2)
BUN SERPL-MCNC: 18.5 MG/DL — SIGNIFICANT CHANGE UP (ref 8–20)
CA-I BLDA-SCNC: 1.07 MMOL/L — LOW (ref 1.15–1.33)
CA-I BLDA-SCNC: 1.09 MMOL/L — LOW (ref 1.15–1.33)
CA-I BLDA-SCNC: 1.09 MMOL/L — LOW (ref 1.15–1.33)
CA-I BLDA-SCNC: 1.27 MMOL/L — SIGNIFICANT CHANGE UP (ref 1.15–1.33)
CA-I BLDA-SCNC: 1.27 MMOL/L — SIGNIFICANT CHANGE UP (ref 1.15–1.33)
CA-I BLDA-SCNC: 1.3 MMOL/L — SIGNIFICANT CHANGE UP (ref 1.15–1.33)
CA-I SERPL-SCNC: 1.07 MMOL/L — LOW (ref 1.15–1.33)
CA-I SERPL-SCNC: 1.07 MMOL/L — LOW (ref 1.15–1.33)
CA-I SERPL-SCNC: 1.08 MMOL/L — LOW (ref 1.15–1.33)
CALCIUM SERPL-MCNC: 8.2 MG/DL — LOW (ref 8.4–10.5)
CHLORIDE BLDA-SCNC: 103 MMOL/L — SIGNIFICANT CHANGE UP (ref 96–108)
CHLORIDE BLDA-SCNC: 105 MMOL/L — SIGNIFICANT CHANGE UP (ref 96–108)
CHLORIDE BLDA-SCNC: 106 MMOL/L — SIGNIFICANT CHANGE UP (ref 96–108)
CHLORIDE BLDA-SCNC: 107 MMOL/L — SIGNIFICANT CHANGE UP (ref 96–108)
CHLORIDE BLDV-SCNC: 105 MMOL/L — SIGNIFICANT CHANGE UP (ref 96–108)
CHLORIDE BLDV-SCNC: 106 MMOL/L — SIGNIFICANT CHANGE UP (ref 96–108)
CHLORIDE BLDV-SCNC: 106 MMOL/L — SIGNIFICANT CHANGE UP (ref 96–108)
CHLORIDE SERPL-SCNC: 109 MMOL/L — HIGH (ref 96–108)
CK MB CFR SERPL CALC: 62.1 NG/ML — HIGH (ref 0–6.7)
CK SERPL-CCNC: 449 U/L — HIGH (ref 30–200)
CO2 SERPL-SCNC: 20 MMOL/L — LOW (ref 22–29)
COHGB MFR BLDA: 0.6 % — SIGNIFICANT CHANGE UP
COHGB MFR BLDA: 0.6 % — SIGNIFICANT CHANGE UP
COHGB MFR BLDA: 0.8 % — SIGNIFICANT CHANGE UP
COHGB MFR BLDA: 1.1 % — SIGNIFICANT CHANGE UP
COHGB MFR BLDA: 1.1 % — SIGNIFICANT CHANGE UP
COHGB MFR BLDA: 1.2 % — SIGNIFICANT CHANGE UP
COHGB MFR BLDV: 0.8 % — SIGNIFICANT CHANGE UP
COHGB MFR BLDV: 1.2 % — SIGNIFICANT CHANGE UP
COHGB MFR BLDV: 1.2 % — SIGNIFICANT CHANGE UP
CREAT SERPL-MCNC: 1.03 MG/DL — SIGNIFICANT CHANGE UP (ref 0.5–1.3)
EGFR: 78 ML/MIN/1.73M2 — SIGNIFICANT CHANGE UP
EOSINOPHIL # BLD AUTO: 0 K/UL — SIGNIFICANT CHANGE UP (ref 0–0.5)
EOSINOPHIL NFR BLD AUTO: 0 % — SIGNIFICANT CHANGE UP (ref 0–6)
GAS PNL BLDA: SIGNIFICANT CHANGE UP
GAS PNL BLDA: SIGNIFICANT CHANGE UP
GAS PNL BLDV: 134 MMOL/L — LOW (ref 136–145)
GAS PNL BLDV: 134 MMOL/L — LOW (ref 136–145)
GAS PNL BLDV: 136 MMOL/L — SIGNIFICANT CHANGE UP (ref 136–145)
GIANT PLATELETS BLD QL SMEAR: PRESENT — SIGNIFICANT CHANGE UP
GLUCOSE BLDA-MCNC: 118 MG/DL — HIGH (ref 70–99)
GLUCOSE BLDA-MCNC: 141 MG/DL — HIGH (ref 70–99)
GLUCOSE BLDA-MCNC: 154 MG/DL — HIGH (ref 70–99)
GLUCOSE BLDA-MCNC: 165 MG/DL — HIGH (ref 70–99)
GLUCOSE BLDA-MCNC: 177 MG/DL — HIGH (ref 70–99)
GLUCOSE BLDA-MCNC: 186 MG/DL — HIGH (ref 70–99)
GLUCOSE BLDC GLUCOMTR-MCNC: 106 MG/DL — HIGH (ref 70–99)
GLUCOSE BLDC GLUCOMTR-MCNC: 123 MG/DL — HIGH (ref 70–99)
GLUCOSE BLDC GLUCOMTR-MCNC: 127 MG/DL — HIGH (ref 70–99)
GLUCOSE BLDC GLUCOMTR-MCNC: 151 MG/DL — HIGH (ref 70–99)
GLUCOSE BLDC GLUCOMTR-MCNC: 159 MG/DL — HIGH (ref 70–99)
GLUCOSE BLDC GLUCOMTR-MCNC: 174 MG/DL — HIGH (ref 70–99)
GLUCOSE BLDC GLUCOMTR-MCNC: 190 MG/DL — HIGH (ref 70–99)
GLUCOSE BLDV-MCNC: 139 MG/DL — HIGH (ref 70–99)
GLUCOSE BLDV-MCNC: 156 MG/DL — HIGH (ref 70–99)
GLUCOSE BLDV-MCNC: 182 MG/DL — HIGH (ref 70–99)
GLUCOSE SERPL-MCNC: 160 MG/DL — HIGH (ref 70–99)
HCO3 BLDA-SCNC: 22 MMOL/L — SIGNIFICANT CHANGE UP (ref 21–28)
HCO3 BLDA-SCNC: 25 MMOL/L — SIGNIFICANT CHANGE UP (ref 21–28)
HCO3 BLDA-SCNC: 26 MMOL/L — SIGNIFICANT CHANGE UP (ref 21–28)
HCO3 BLDA-SCNC: 29 MMOL/L — HIGH (ref 21–28)
HCO3 BLDV-SCNC: 25 MMOL/L — SIGNIFICANT CHANGE UP (ref 22–29)
HCT VFR BLD CALC: 27.9 % — LOW (ref 39–50)
HCT VFR BLDA CALC: 23 % — SIGNIFICANT CHANGE UP
HCT VFR BLDA CALC: 24 % — SIGNIFICANT CHANGE UP
HCT VFR BLDA CALC: 24 % — SIGNIFICANT CHANGE UP
HCT VFR BLDA CALC: 25 % — SIGNIFICANT CHANGE UP
HCT VFR BLDA CALC: 25 % — SIGNIFICANT CHANGE UP
HCT VFR BLDA CALC: 35 % — SIGNIFICANT CHANGE UP
HCT VFR BLDA CALC: 40 % — SIGNIFICANT CHANGE UP
HGB BLD CALC-MCNC: 7.8 G/DL — LOW (ref 12.6–17.4)
HGB BLD CALC-MCNC: 8 G/DL — LOW (ref 12.6–17.4)
HGB BLD CALC-MCNC: 8.4 G/DL — LOW (ref 12.6–17.4)
HGB BLD-MCNC: 9.6 G/DL — LOW (ref 13–17)
HGB BLDA-MCNC: 11.5 G/DL — LOW (ref 12.6–17.4)
HGB BLDA-MCNC: 13.4 G/DL — SIGNIFICANT CHANGE UP (ref 12.6–17.4)
HGB BLDA-MCNC: 7.6 G/DL — LOW (ref 12.6–17.4)
HGB BLDA-MCNC: 7.6 G/DL — LOW (ref 12.6–17.4)
HGB BLDA-MCNC: 7.9 G/DL — LOW (ref 12.6–17.4)
HGB BLDA-MCNC: 8.3 G/DL — LOW (ref 12.6–17.4)
INR BLD: 1.32 RATIO — HIGH (ref 0.88–1.16)
LACTATE BLDA-MCNC: 0.7 MMOL/L — SIGNIFICANT CHANGE UP (ref 0.5–2)
LACTATE BLDA-MCNC: 1.7 MMOL/L — SIGNIFICANT CHANGE UP (ref 0.5–2)
LACTATE BLDA-MCNC: 1.9 MMOL/L — SIGNIFICANT CHANGE UP (ref 0.5–2)
LACTATE BLDA-MCNC: 2 MMOL/L — SIGNIFICANT CHANGE UP (ref 0.5–2)
LACTATE BLDA-MCNC: 2.1 MMOL/L — HIGH (ref 0.5–2)
LACTATE BLDA-MCNC: 2.6 MMOL/L — HIGH (ref 0.5–2)
LACTATE BLDV-MCNC: 1.6 MMOL/L — SIGNIFICANT CHANGE UP (ref 0.5–2)
LACTATE BLDV-MCNC: 1.9 MMOL/L — SIGNIFICANT CHANGE UP (ref 0.5–2)
LACTATE BLDV-MCNC: 2.1 MMOL/L — HIGH (ref 0.5–2)
LYMPHOCYTES # BLD AUTO: 1.24 K/UL — SIGNIFICANT CHANGE UP (ref 1–3.3)
LYMPHOCYTES # BLD AUTO: 4.3 % — LOW (ref 13–44)
MAGNESIUM SERPL-MCNC: 3.4 MG/DL — HIGH (ref 1.6–2.6)
MANUAL SMEAR VERIFICATION: SIGNIFICANT CHANGE UP
MCHC RBC-ENTMCNC: 31 PG — SIGNIFICANT CHANGE UP (ref 27–34)
MCHC RBC-ENTMCNC: 34.4 GM/DL — SIGNIFICANT CHANGE UP (ref 32–36)
MCV RBC AUTO: 90 FL — SIGNIFICANT CHANGE UP (ref 80–100)
METHGB MFR BLDA: 0.5 % — SIGNIFICANT CHANGE UP
METHGB MFR BLDA: 0.6 % — SIGNIFICANT CHANGE UP
METHGB MFR BLDA: 0.8 % — SIGNIFICANT CHANGE UP
METHGB MFR BLDA: 0.9 % — SIGNIFICANT CHANGE UP
METHGB MFR BLDV: 0.3 % — SIGNIFICANT CHANGE UP
METHGB MFR BLDV: 0.7 % — SIGNIFICANT CHANGE UP
METHGB MFR BLDV: 1.1 % — SIGNIFICANT CHANGE UP
MONOCYTES # BLD AUTO: 1.49 K/UL — HIGH (ref 0–0.9)
MONOCYTES NFR BLD AUTO: 5.2 % — SIGNIFICANT CHANGE UP (ref 2–14)
MYELOCYTES NFR BLD: 0.9 % — HIGH (ref 0–0)
NEUTROPHILS # BLD AUTO: 25.48 K/UL — HIGH (ref 1.8–7.4)
NEUTROPHILS NFR BLD AUTO: 88.7 % — HIGH (ref 43–77)
OXYHGB MFR BLDA: 98 % — HIGH (ref 90–95)
OXYHGB MFR BLDA: 99 % — HIGH (ref 90–95)
PCO2 BLDA: 38 MMHG — SIGNIFICANT CHANGE UP (ref 35–48)
PCO2 BLDA: 39 MMHG — SIGNIFICANT CHANGE UP (ref 35–48)
PCO2 BLDA: 42 MMHG — SIGNIFICANT CHANGE UP (ref 35–48)
PCO2 BLDA: 42 MMHG — SIGNIFICANT CHANGE UP (ref 35–48)
PCO2 BLDA: 44 MMHG — SIGNIFICANT CHANGE UP (ref 35–48)
PCO2 BLDA: 45 MMHG — SIGNIFICANT CHANGE UP (ref 35–48)
PCO2 BLDV: 45 MMHG — SIGNIFICANT CHANGE UP (ref 42–55)
PCO2 BLDV: 46 MMHG — SIGNIFICANT CHANGE UP (ref 42–55)
PCO2 BLDV: 48 MMHG — SIGNIFICANT CHANGE UP (ref 42–55)
PH BLDA: 7.36 — SIGNIFICANT CHANGE UP (ref 7.35–7.45)
PH BLDA: 7.38 — SIGNIFICANT CHANGE UP (ref 7.35–7.45)
PH BLDA: 7.38 — SIGNIFICANT CHANGE UP (ref 7.35–7.45)
PH BLDA: 7.4 — SIGNIFICANT CHANGE UP (ref 7.35–7.45)
PH BLDA: 7.42 — SIGNIFICANT CHANGE UP (ref 7.35–7.45)
PH BLDA: 7.42 — SIGNIFICANT CHANGE UP (ref 7.35–7.45)
PH BLDV: 7.33 — SIGNIFICANT CHANGE UP (ref 7.32–7.43)
PH BLDV: 7.35 — SIGNIFICANT CHANGE UP (ref 7.32–7.43)
PH BLDV: 7.36 — SIGNIFICANT CHANGE UP (ref 7.32–7.43)
PLAT MORPH BLD: NORMAL — SIGNIFICANT CHANGE UP
PLATELET # BLD AUTO: 143 K/UL — LOW (ref 150–400)
PO2 BLDA: 267 MMHG — HIGH (ref 83–108)
PO2 BLDA: 269 MMHG — HIGH (ref 83–108)
PO2 BLDA: 406 MMHG — HIGH (ref 83–108)
PO2 BLDA: 490 MMHG — HIGH (ref 83–108)
PO2 BLDA: >496 MMHG — HIGH (ref 83–108)
PO2 BLDA: >496 MMHG — HIGH (ref 83–108)
PO2 BLDV: 55 MMHG — HIGH (ref 25–45)
PO2 BLDV: 72 MMHG — HIGH (ref 25–45)
PO2 BLDV: 89 MMHG — HIGH (ref 25–45)
POTASSIUM BLDA-SCNC: 4 MMOL/L — SIGNIFICANT CHANGE UP (ref 3.5–5.1)
POTASSIUM BLDA-SCNC: 4.3 MMOL/L — SIGNIFICANT CHANGE UP (ref 3.5–5.1)
POTASSIUM BLDA-SCNC: 4.3 MMOL/L — SIGNIFICANT CHANGE UP (ref 3.5–5.1)
POTASSIUM BLDA-SCNC: 5 MMOL/L — SIGNIFICANT CHANGE UP (ref 3.5–5.1)
POTASSIUM BLDA-SCNC: 5.3 MMOL/L — HIGH (ref 3.5–5.1)
POTASSIUM BLDA-SCNC: 5.4 MMOL/L — HIGH (ref 3.5–5.1)
POTASSIUM BLDV-SCNC: 4.8 MMOL/L — SIGNIFICANT CHANGE UP (ref 3.5–5.1)
POTASSIUM BLDV-SCNC: 5.2 MMOL/L — HIGH (ref 3.5–5.1)
POTASSIUM BLDV-SCNC: 5.2 MMOL/L — HIGH (ref 3.5–5.1)
POTASSIUM SERPL-MCNC: 4.5 MMOL/L — SIGNIFICANT CHANGE UP (ref 3.5–5.3)
POTASSIUM SERPL-SCNC: 4.5 MMOL/L — SIGNIFICANT CHANGE UP (ref 3.5–5.3)
PROT SERPL-MCNC: 4 G/DL — LOW (ref 6.6–8.7)
PROTHROM AB SERPL-ACNC: 15.4 SEC — HIGH (ref 10.5–13.4)
RBC # BLD: 3.1 M/UL — LOW (ref 4.2–5.8)
RBC # FLD: 13.1 % — SIGNIFICANT CHANGE UP (ref 10.3–14.5)
RBC BLD AUTO: NORMAL — SIGNIFICANT CHANGE UP
SAO2 % BLDA: 100 % — HIGH (ref 94–98)
SAO2 % BLDA: 99.8 % — HIGH (ref 94–98)
SAO2 % BLDA: 99.8 % — HIGH (ref 94–98)
SAO2 % BLDV: 87.1 % — SIGNIFICANT CHANGE UP (ref 67–88)
SAO2 % BLDV: 95.8 % — HIGH (ref 67–88)
SAO2 % BLDV: 98.1 % — HIGH (ref 67–88)
SODIUM BLDA-SCNC: 134 MMOL/L — LOW (ref 136–145)
SODIUM BLDA-SCNC: 135 MMOL/L — LOW (ref 136–145)
SODIUM BLDA-SCNC: 136 MMOL/L — SIGNIFICANT CHANGE UP (ref 136–145)
SODIUM BLDA-SCNC: 137 MMOL/L — SIGNIFICANT CHANGE UP (ref 136–145)
SODIUM SERPL-SCNC: 139 MMOL/L — SIGNIFICANT CHANGE UP (ref 135–145)
TROPONIN T SERPL-MCNC: 1.84 NG/ML — HIGH (ref 0–0.06)
WBC # BLD: 28.73 K/UL — HIGH (ref 3.8–10.5)
WBC # FLD AUTO: 28.73 K/UL — HIGH (ref 3.8–10.5)

## 2023-02-04 PROCEDURE — 76998 US GUIDE INTRAOP: CPT | Mod: 26,NC,AS,59

## 2023-02-04 PROCEDURE — 33508 ENDOSCOPIC VEIN HARVEST: CPT | Mod: 59

## 2023-02-04 PROCEDURE — 33512 CABG VEIN THREE: CPT

## 2023-02-04 PROCEDURE — 33533 CABG ARTERIAL SINGLE: CPT | Mod: AS

## 2023-02-04 PROCEDURE — 33533 CABG ARTERIAL SINGLE: CPT

## 2023-02-04 PROCEDURE — 33512 CABG VEIN THREE: CPT | Mod: AS

## 2023-02-04 PROCEDURE — 71045 X-RAY EXAM CHEST 1 VIEW: CPT | Mod: 26,76

## 2023-02-04 PROCEDURE — 99291 CRITICAL CARE FIRST HOUR: CPT | Mod: FS

## 2023-02-04 PROCEDURE — 93010 ELECTROCARDIOGRAM REPORT: CPT

## 2023-02-04 PROCEDURE — 76998 US GUIDE INTRAOP: CPT | Mod: 26,59

## 2023-02-04 DEVICE — CHEST DRAIN THORACIC ARGYLE PVC 28FR RIGHT ANGLE: Type: IMPLANTABLE DEVICE | Status: FUNCTIONAL

## 2023-02-04 DEVICE — LIGATING CLIPS WECK HORIZON SMALL-WIDE (RED) 24: Type: IMPLANTABLE DEVICE | Status: FUNCTIONAL

## 2023-02-04 DEVICE — CANNULA ARTERIAL SOFT-FLOW 21FR EXTENDED VENTED: Type: IMPLANTABLE DEVICE | Status: FUNCTIONAL

## 2023-02-04 DEVICE — LIGATING CLIPS WECK HORIZON MEDIUM (BLUE) 24: Type: IMPLANTABLE DEVICE | Status: FUNCTIONAL

## 2023-02-04 DEVICE — PACING WIRE ORANGE M-25 WINGED WIRE 37MM X 89MM: Type: IMPLANTABLE DEVICE | Status: FUNCTIONAL

## 2023-02-04 DEVICE — CANNULA AORTIC ROOT WITH VENT LINE 12G X 14CM FLANGED: Type: IMPLANTABLE DEVICE | Status: FUNCTIONAL

## 2023-02-04 DEVICE — CANNULA VENOUS 2 STAGE OPEN LIGHTHOUSE TIP 32-40FR X 1/2" NON-VENTED: Type: IMPLANTABLE DEVICE | Status: FUNCTIONAL

## 2023-02-04 DEVICE — CANNULA RETROGRADE CARDIOPLEGIA SELF-INFLATING 14FR PRE-SHAPED STYLET/HANDLE: Type: IMPLANTABLE DEVICE | Status: FUNCTIONAL

## 2023-02-04 DEVICE — MEDIASTINAL CATH DRAIN 9MM: Type: IMPLANTABLE DEVICE | Status: FUNCTIONAL

## 2023-02-04 DEVICE — FLOSEAL FAST PREP 10ML: Type: IMPLANTABLE DEVICE | Status: FUNCTIONAL

## 2023-02-04 DEVICE — KIT CVC 2LUM MAC 9FR CHG: Type: IMPLANTABLE DEVICE | Status: FUNCTIONAL

## 2023-02-04 DEVICE — KIT A-LINE 1LUM 20G X 12CM SAFE KIT: Type: IMPLANTABLE DEVICE | Status: FUNCTIONAL

## 2023-02-04 DEVICE — CANNULA ATRASUMP 1/4" X 38CM: Type: IMPLANTABLE DEVICE | Status: FUNCTIONAL

## 2023-02-04 DEVICE — CANNULA VESSEL 3MM BLUNT TIP CLEAR 1-WAY VALVE: Type: IMPLANTABLE DEVICE | Status: FUNCTIONAL

## 2023-02-04 DEVICE — BONE WAX 2.5GM: Type: IMPLANTABLE DEVICE | Status: FUNCTIONAL

## 2023-02-04 DEVICE — LIGATING CLIPS WECK HORIZON LARGE (ORANGE) 6: Type: IMPLANTABLE DEVICE | Status: FUNCTIONAL

## 2023-02-04 DEVICE — PACING WIRE WHITE M-25 WINGED WIRE 37MM X 89MM: Type: IMPLANTABLE DEVICE | Status: FUNCTIONAL

## 2023-02-04 DEVICE — OCCLUDER INTERNAL VESSEL FLO-RESTER 1 X 12MM: Type: IMPLANTABLE DEVICE | Status: FUNCTIONAL

## 2023-02-04 RX ORDER — ALBUMIN HUMAN 25 %
250 VIAL (ML) INTRAVENOUS ONCE
Refills: 0 | Status: COMPLETED | OUTPATIENT
Start: 2023-02-04 | End: 2023-02-04

## 2023-02-04 RX ORDER — INSULIN HUMAN 100 [IU]/ML
2 INJECTION, SOLUTION SUBCUTANEOUS
Qty: 100 | Refills: 0 | Status: DISCONTINUED | OUTPATIENT
Start: 2023-02-04 | End: 2023-02-05

## 2023-02-04 RX ORDER — CEFUROXIME AXETIL 250 MG
1500 TABLET ORAL EVERY 8 HOURS
Refills: 0 | Status: COMPLETED | OUTPATIENT
Start: 2023-02-04 | End: 2023-02-06

## 2023-02-04 RX ORDER — SENNA PLUS 8.6 MG/1
2 TABLET ORAL AT BEDTIME
Refills: 0 | Status: DISCONTINUED | OUTPATIENT
Start: 2023-02-05 | End: 2023-02-09

## 2023-02-04 RX ORDER — POTASSIUM CHLORIDE 20 MEQ
10 PACKET (EA) ORAL
Refills: 0 | Status: DISCONTINUED | OUTPATIENT
Start: 2023-02-04 | End: 2023-02-04

## 2023-02-04 RX ORDER — SODIUM CHLORIDE 9 MG/ML
1000 INJECTION INTRAMUSCULAR; INTRAVENOUS; SUBCUTANEOUS
Refills: 0 | Status: DISCONTINUED | OUTPATIENT
Start: 2023-02-04 | End: 2023-02-06

## 2023-02-04 RX ORDER — VANCOMYCIN HCL 1 G
1000 VIAL (EA) INTRAVENOUS EVERY 12 HOURS
Refills: 0 | Status: COMPLETED | OUTPATIENT
Start: 2023-02-04 | End: 2023-02-06

## 2023-02-04 RX ORDER — HYDROMORPHONE HYDROCHLORIDE 2 MG/ML
0.25 INJECTION INTRAMUSCULAR; INTRAVENOUS; SUBCUTANEOUS ONCE
Refills: 0 | Status: DISCONTINUED | OUTPATIENT
Start: 2023-02-04 | End: 2023-02-04

## 2023-02-04 RX ORDER — POLYETHYLENE GLYCOL 3350 17 G/17G
17 POWDER, FOR SOLUTION ORAL DAILY
Refills: 0 | Status: DISCONTINUED | OUTPATIENT
Start: 2023-02-05 | End: 2023-02-09

## 2023-02-04 RX ORDER — PANTOPRAZOLE SODIUM 20 MG/1
40 TABLET, DELAYED RELEASE ORAL DAILY
Refills: 0 | Status: DISCONTINUED | OUTPATIENT
Start: 2023-02-05 | End: 2023-02-09

## 2023-02-04 RX ORDER — ASPIRIN/CALCIUM CARB/MAGNESIUM 324 MG
325 TABLET ORAL DAILY
Refills: 0 | Status: DISCONTINUED | OUTPATIENT
Start: 2023-02-05 | End: 2023-02-07

## 2023-02-04 RX ORDER — ACETAMINOPHEN 500 MG
1000 TABLET ORAL ONCE
Refills: 0 | Status: COMPLETED | OUTPATIENT
Start: 2023-02-04 | End: 2023-02-04

## 2023-02-04 RX ORDER — CHLORHEXIDINE GLUCONATE 213 G/1000ML
1 SOLUTION TOPICAL DAILY
Refills: 0 | Status: DISCONTINUED | OUTPATIENT
Start: 2023-02-04 | End: 2023-02-06

## 2023-02-04 RX ORDER — PANTOPRAZOLE SODIUM 20 MG/1
40 TABLET, DELAYED RELEASE ORAL ONCE
Refills: 0 | Status: COMPLETED | OUTPATIENT
Start: 2023-02-04 | End: 2023-02-04

## 2023-02-04 RX ORDER — CHLORHEXIDINE GLUCONATE 213 G/1000ML
5 SOLUTION TOPICAL
Refills: 0 | Status: DISCONTINUED | OUTPATIENT
Start: 2023-02-04 | End: 2023-02-04

## 2023-02-04 RX ORDER — CHLORHEXIDINE GLUCONATE 213 G/1000ML
15 SOLUTION TOPICAL EVERY 12 HOURS
Refills: 0 | Status: DISCONTINUED | OUTPATIENT
Start: 2023-02-04 | End: 2023-02-04

## 2023-02-04 RX ORDER — ASPIRIN/CALCIUM CARB/MAGNESIUM 324 MG
325 TABLET ORAL ONCE
Refills: 0 | Status: COMPLETED | OUTPATIENT
Start: 2023-02-04 | End: 2023-02-04

## 2023-02-04 RX ORDER — ATORVASTATIN CALCIUM 80 MG/1
80 TABLET, FILM COATED ORAL AT BEDTIME
Refills: 0 | Status: DISCONTINUED | OUTPATIENT
Start: 2023-02-04 | End: 2023-02-09

## 2023-02-04 RX ORDER — NOREPINEPHRINE BITARTRATE/D5W 8 MG/250ML
0.05 PLASTIC BAG, INJECTION (ML) INTRAVENOUS
Qty: 8 | Refills: 0 | Status: DISCONTINUED | OUTPATIENT
Start: 2023-02-04 | End: 2023-02-05

## 2023-02-04 RX ORDER — PROPOFOL 10 MG/ML
10 INJECTION, EMULSION INTRAVENOUS
Qty: 1000 | Refills: 0 | Status: DISCONTINUED | OUTPATIENT
Start: 2023-02-04 | End: 2023-02-04

## 2023-02-04 RX ORDER — DEXTROSE 50 % IN WATER 50 %
50 SYRINGE (ML) INTRAVENOUS
Refills: 0 | Status: DISCONTINUED | OUTPATIENT
Start: 2023-02-04 | End: 2023-02-05

## 2023-02-04 RX ORDER — ONDANSETRON 8 MG/1
4 TABLET, FILM COATED ORAL ONCE
Refills: 0 | Status: COMPLETED | OUTPATIENT
Start: 2023-02-04 | End: 2023-02-04

## 2023-02-04 RX ORDER — POTASSIUM CHLORIDE 20 MEQ
10 PACKET (EA) ORAL
Refills: 0 | Status: COMPLETED | OUTPATIENT
Start: 2023-02-04 | End: 2023-02-04

## 2023-02-04 RX ORDER — DEXTROSE 50 % IN WATER 50 %
25 SYRINGE (ML) INTRAVENOUS
Refills: 0 | Status: DISCONTINUED | OUTPATIENT
Start: 2023-02-04 | End: 2023-02-05

## 2023-02-04 RX ADMIN — HYDROMORPHONE HYDROCHLORIDE 0.25 MILLIGRAM(S): 2 INJECTION INTRAMUSCULAR; INTRAVENOUS; SUBCUTANEOUS at 17:26

## 2023-02-04 RX ADMIN — SODIUM CHLORIDE 3 MILLILITER(S): 9 INJECTION INTRAMUSCULAR; INTRAVENOUS; SUBCUTANEOUS at 05:22

## 2023-02-04 RX ADMIN — CHLORHEXIDINE GLUCONATE 1 APPLICATION(S): 213 SOLUTION TOPICAL at 05:37

## 2023-02-04 RX ADMIN — PANTOPRAZOLE SODIUM 40 MILLIGRAM(S): 20 TABLET, DELAYED RELEASE ORAL at 14:39

## 2023-02-04 RX ADMIN — Medication 400 MILLIGRAM(S): at 21:44

## 2023-02-04 RX ADMIN — Medication 12.5 MILLIGRAM(S): at 05:31

## 2023-02-04 RX ADMIN — CHLORHEXIDINE GLUCONATE 15 MILLILITER(S): 213 SOLUTION TOPICAL at 05:30

## 2023-02-04 RX ADMIN — Medication 125 MILLILITER(S): at 21:44

## 2023-02-04 RX ADMIN — Medication 100 MILLIEQUIVALENT(S): at 13:18

## 2023-02-04 RX ADMIN — ATORVASTATIN CALCIUM 80 MILLIGRAM(S): 80 TABLET, FILM COATED ORAL at 19:32

## 2023-02-04 RX ADMIN — PANTOPRAZOLE SODIUM 40 MILLIGRAM(S): 20 TABLET, DELAYED RELEASE ORAL at 05:31

## 2023-02-04 RX ADMIN — Medication 1000 MILLIGRAM(S): at 21:59

## 2023-02-04 RX ADMIN — HYDROMORPHONE HYDROCHLORIDE 0.25 MILLIGRAM(S): 2 INJECTION INTRAMUSCULAR; INTRAVENOUS; SUBCUTANEOUS at 17:49

## 2023-02-04 RX ADMIN — ONDANSETRON 4 MILLIGRAM(S): 8 TABLET, FILM COATED ORAL at 19:00

## 2023-02-04 RX ADMIN — Medication 400 MILLIGRAM(S): at 15:39

## 2023-02-04 RX ADMIN — Medication 250 MILLIGRAM(S): at 19:23

## 2023-02-04 RX ADMIN — Medication 125 MILLILITER(S): at 14:41

## 2023-02-04 RX ADMIN — HYDROMORPHONE HYDROCHLORIDE 0.25 MILLIGRAM(S): 2 INJECTION INTRAMUSCULAR; INTRAVENOUS; SUBCUTANEOUS at 14:15

## 2023-02-04 RX ADMIN — Medication 325 MILLIGRAM(S): at 19:31

## 2023-02-04 RX ADMIN — HYDROMORPHONE HYDROCHLORIDE 0.25 MILLIGRAM(S): 2 INJECTION INTRAMUSCULAR; INTRAVENOUS; SUBCUTANEOUS at 13:44

## 2023-02-04 RX ADMIN — Medication 1000 MILLIGRAM(S): at 16:16

## 2023-02-04 RX ADMIN — Medication 125 MILLILITER(S): at 17:39

## 2023-02-04 RX ADMIN — Medication 100 MILLIGRAM(S): at 15:40

## 2023-02-04 NOTE — BRIEF OPERATIVE NOTE - COMMENTS
No qualified resident was available to assist in this case. I have personally first assisted the Cardiothoracic Surgeon listed in this brief op note throughout the entirety of this case.  unable to calculate blood loss due to use of cell saver.

## 2023-02-04 NOTE — BRIEF OPERATIVE NOTE - NSICDXBRIEFPROCEDURE_GEN_ALL_CORE_FT
PROCEDURES:  CABG, using LIMA and saphenous vein graft, with vein surgical procurement 04-Feb-2023 12:07:48 C4L (lima to lad svg to pl, svg to om, svg to diag sequential to svg graft) Right greater saphenous vein endoscopic harvest Lyn Parham

## 2023-02-05 LAB
ALBUMIN SERPL ELPH-MCNC: 3.8 G/DL — SIGNIFICANT CHANGE UP (ref 3.3–5.2)
ALP SERPL-CCNC: 41 U/L — SIGNIFICANT CHANGE UP (ref 40–120)
ALT FLD-CCNC: 15 U/L — SIGNIFICANT CHANGE UP
ANION GAP SERPL CALC-SCNC: 10 MMOL/L — SIGNIFICANT CHANGE UP (ref 5–17)
ANION GAP SERPL CALC-SCNC: 11 MMOL/L — SIGNIFICANT CHANGE UP (ref 5–17)
AST SERPL-CCNC: 46 U/L — HIGH
BILIRUB DIRECT SERPL-MCNC: 0.2 MG/DL — SIGNIFICANT CHANGE UP (ref 0–0.3)
BILIRUB INDIRECT FLD-MCNC: 0.4 MG/DL — SIGNIFICANT CHANGE UP (ref 0.2–1)
BILIRUB SERPL-MCNC: 0.6 MG/DL — SIGNIFICANT CHANGE UP (ref 0.4–2)
BUN SERPL-MCNC: 21 MG/DL — HIGH (ref 8–20)
BUN SERPL-MCNC: 23.6 MG/DL — HIGH (ref 8–20)
CALCIUM SERPL-MCNC: 8.1 MG/DL — LOW (ref 8.4–10.5)
CALCIUM SERPL-MCNC: 8.2 MG/DL — LOW (ref 8.4–10.5)
CHLORIDE SERPL-SCNC: 101 MMOL/L — SIGNIFICANT CHANGE UP (ref 96–108)
CHLORIDE SERPL-SCNC: 106 MMOL/L — SIGNIFICANT CHANGE UP (ref 96–108)
CK MB CFR SERPL CALC: 34.8 NG/ML — HIGH (ref 0–6.7)
CK SERPL-CCNC: 617 U/L — HIGH (ref 30–200)
CO2 SERPL-SCNC: 21 MMOL/L — LOW (ref 22–29)
CO2 SERPL-SCNC: 22 MMOL/L — SIGNIFICANT CHANGE UP (ref 22–29)
CREAT SERPL-MCNC: 1.13 MG/DL — SIGNIFICANT CHANGE UP (ref 0.5–1.3)
CREAT SERPL-MCNC: 1.26 MG/DL — SIGNIFICANT CHANGE UP (ref 0.5–1.3)
EGFR: 61 ML/MIN/1.73M2 — SIGNIFICANT CHANGE UP
EGFR: 70 ML/MIN/1.73M2 — SIGNIFICANT CHANGE UP
GLUCOSE BLDC GLUCOMTR-MCNC: 100 MG/DL — HIGH (ref 70–99)
GLUCOSE BLDC GLUCOMTR-MCNC: 101 MG/DL — HIGH (ref 70–99)
GLUCOSE BLDC GLUCOMTR-MCNC: 104 MG/DL — HIGH (ref 70–99)
GLUCOSE BLDC GLUCOMTR-MCNC: 107 MG/DL — HIGH (ref 70–99)
GLUCOSE BLDC GLUCOMTR-MCNC: 112 MG/DL — HIGH (ref 70–99)
GLUCOSE BLDC GLUCOMTR-MCNC: 123 MG/DL — HIGH (ref 70–99)
GLUCOSE BLDC GLUCOMTR-MCNC: 137 MG/DL — HIGH (ref 70–99)
GLUCOSE BLDC GLUCOMTR-MCNC: 165 MG/DL — HIGH (ref 70–99)
GLUCOSE SERPL-MCNC: 119 MG/DL — HIGH (ref 70–99)
GLUCOSE SERPL-MCNC: 96 MG/DL — SIGNIFICANT CHANGE UP (ref 70–99)
HCT VFR BLD CALC: 24.9 % — LOW (ref 39–50)
HCT VFR BLD CALC: 29.1 % — LOW (ref 39–50)
HGB BLD-MCNC: 10.1 G/DL — LOW (ref 13–17)
HGB BLD-MCNC: 8.4 G/DL — LOW (ref 13–17)
INR BLD: 1.2 RATIO — HIGH (ref 0.88–1.16)
LACTATE SERPL-SCNC: 0.7 MMOL/L — SIGNIFICANT CHANGE UP (ref 0.5–2)
MAGNESIUM SERPL-MCNC: 2.1 MG/DL — SIGNIFICANT CHANGE UP (ref 1.6–2.6)
MAGNESIUM SERPL-MCNC: 2.2 MG/DL — SIGNIFICANT CHANGE UP (ref 1.6–2.6)
MCHC RBC-ENTMCNC: 30.3 PG — SIGNIFICANT CHANGE UP (ref 27–34)
MCHC RBC-ENTMCNC: 31.2 PG — SIGNIFICANT CHANGE UP (ref 27–34)
MCHC RBC-ENTMCNC: 33.7 GM/DL — SIGNIFICANT CHANGE UP (ref 32–36)
MCHC RBC-ENTMCNC: 34.7 GM/DL — SIGNIFICANT CHANGE UP (ref 32–36)
MCV RBC AUTO: 89.8 FL — SIGNIFICANT CHANGE UP (ref 80–100)
MCV RBC AUTO: 89.9 FL — SIGNIFICANT CHANGE UP (ref 80–100)
PLATELET # BLD AUTO: 158 K/UL — SIGNIFICANT CHANGE UP (ref 150–400)
PLATELET # BLD AUTO: 158 K/UL — SIGNIFICANT CHANGE UP (ref 150–400)
POTASSIUM SERPL-MCNC: 4.1 MMOL/L — SIGNIFICANT CHANGE UP (ref 3.5–5.3)
POTASSIUM SERPL-MCNC: 4.4 MMOL/L — SIGNIFICANT CHANGE UP (ref 3.5–5.3)
POTASSIUM SERPL-SCNC: 4.1 MMOL/L — SIGNIFICANT CHANGE UP (ref 3.5–5.3)
POTASSIUM SERPL-SCNC: 4.4 MMOL/L — SIGNIFICANT CHANGE UP (ref 3.5–5.3)
PROT SERPL-MCNC: 5.2 G/DL — LOW (ref 6.6–8.7)
PROTHROM AB SERPL-ACNC: 14 SEC — HIGH (ref 10.5–13.4)
RBC # BLD: 2.77 M/UL — LOW (ref 4.2–5.8)
RBC # BLD: 3.24 M/UL — LOW (ref 4.2–5.8)
RBC # FLD: 13.3 % — SIGNIFICANT CHANGE UP (ref 10.3–14.5)
RBC # FLD: 13.3 % — SIGNIFICANT CHANGE UP (ref 10.3–14.5)
SODIUM SERPL-SCNC: 132 MMOL/L — LOW (ref 135–145)
SODIUM SERPL-SCNC: 139 MMOL/L — SIGNIFICANT CHANGE UP (ref 135–145)
TROPONIN T SERPL-MCNC: 1.3 NG/ML — HIGH (ref 0–0.06)
WBC # BLD: 13 K/UL — HIGH (ref 3.8–10.5)
WBC # BLD: 14.85 K/UL — HIGH (ref 3.8–10.5)
WBC # FLD AUTO: 13 K/UL — HIGH (ref 3.8–10.5)
WBC # FLD AUTO: 14.85 K/UL — HIGH (ref 3.8–10.5)

## 2023-02-05 PROCEDURE — 71045 X-RAY EXAM CHEST 1 VIEW: CPT | Mod: 26

## 2023-02-05 PROCEDURE — 99024 POSTOP FOLLOW-UP VISIT: CPT

## 2023-02-05 PROCEDURE — 93010 ELECTROCARDIOGRAM REPORT: CPT

## 2023-02-05 RX ORDER — ACETAMINOPHEN 500 MG
1000 TABLET ORAL ONCE
Refills: 0 | Status: COMPLETED | OUTPATIENT
Start: 2023-02-05 | End: 2023-02-05

## 2023-02-05 RX ORDER — GLUCAGON INJECTION, SOLUTION 0.5 MG/.1ML
1 INJECTION, SOLUTION SUBCUTANEOUS ONCE
Refills: 0 | Status: DISCONTINUED | OUTPATIENT
Start: 2023-02-05 | End: 2023-02-07

## 2023-02-05 RX ORDER — ENOXAPARIN SODIUM 100 MG/ML
40 INJECTION SUBCUTANEOUS EVERY 24 HOURS
Refills: 0 | Status: DISCONTINUED | OUTPATIENT
Start: 2023-02-05 | End: 2023-02-09

## 2023-02-05 RX ORDER — DEXTROSE 50 % IN WATER 50 %
25 SYRINGE (ML) INTRAVENOUS ONCE
Refills: 0 | Status: DISCONTINUED | OUTPATIENT
Start: 2023-02-05 | End: 2023-02-05

## 2023-02-05 RX ORDER — DEXTROSE 50 % IN WATER 50 %
25 SYRINGE (ML) INTRAVENOUS ONCE
Refills: 0 | Status: DISCONTINUED | OUTPATIENT
Start: 2023-02-05 | End: 2023-02-07

## 2023-02-05 RX ORDER — METOPROLOL TARTRATE 50 MG
12.5 TABLET ORAL
Refills: 0 | Status: DISCONTINUED | OUTPATIENT
Start: 2023-02-05 | End: 2023-02-06

## 2023-02-05 RX ORDER — AMIODARONE HYDROCHLORIDE 400 MG/1
400 TABLET ORAL EVERY 8 HOURS
Refills: 0 | Status: DISCONTINUED | OUTPATIENT
Start: 2023-02-05 | End: 2023-02-07

## 2023-02-05 RX ORDER — ACETAMINOPHEN 500 MG
975 TABLET ORAL EVERY 6 HOURS
Refills: 0 | Status: COMPLETED | OUTPATIENT
Start: 2023-02-05 | End: 2023-02-07

## 2023-02-05 RX ORDER — INSULIN LISPRO 100/ML
VIAL (ML) SUBCUTANEOUS
Refills: 0 | Status: DISCONTINUED | OUTPATIENT
Start: 2023-02-05 | End: 2023-02-07

## 2023-02-05 RX ORDER — FUROSEMIDE 40 MG
40 TABLET ORAL DAILY
Refills: 0 | Status: DISCONTINUED | OUTPATIENT
Start: 2023-02-06 | End: 2023-02-09

## 2023-02-05 RX ORDER — SODIUM CHLORIDE 9 MG/ML
1000 INJECTION, SOLUTION INTRAVENOUS
Refills: 0 | Status: DISCONTINUED | OUTPATIENT
Start: 2023-02-05 | End: 2023-02-07

## 2023-02-05 RX ORDER — FUROSEMIDE 40 MG
40 TABLET ORAL ONCE
Refills: 0 | Status: COMPLETED | OUTPATIENT
Start: 2023-02-05 | End: 2023-02-05

## 2023-02-05 RX ORDER — DEXTROSE 50 % IN WATER 50 %
15 SYRINGE (ML) INTRAVENOUS ONCE
Refills: 0 | Status: DISCONTINUED | OUTPATIENT
Start: 2023-02-05 | End: 2023-02-07

## 2023-02-05 RX ORDER — DEXTROSE 50 % IN WATER 50 %
12.5 SYRINGE (ML) INTRAVENOUS ONCE
Refills: 0 | Status: DISCONTINUED | OUTPATIENT
Start: 2023-02-05 | End: 2023-02-07

## 2023-02-05 RX ORDER — AMIODARONE HYDROCHLORIDE 400 MG/1
TABLET ORAL
Refills: 0 | Status: DISCONTINUED | OUTPATIENT
Start: 2023-02-05 | End: 2023-02-07

## 2023-02-05 RX ORDER — OXYCODONE HYDROCHLORIDE 5 MG/1
5 TABLET ORAL EVERY 4 HOURS
Refills: 0 | Status: DISCONTINUED | OUTPATIENT
Start: 2023-02-05 | End: 2023-02-09

## 2023-02-05 RX ORDER — OXYCODONE HYDROCHLORIDE 5 MG/1
10 TABLET ORAL
Refills: 0 | Status: DISCONTINUED | OUTPATIENT
Start: 2023-02-05 | End: 2023-02-09

## 2023-02-05 RX ADMIN — ATORVASTATIN CALCIUM 80 MILLIGRAM(S): 80 TABLET, FILM COATED ORAL at 21:48

## 2023-02-05 RX ADMIN — Medication 1000 MILLIGRAM(S): at 06:57

## 2023-02-05 RX ADMIN — Medication 400 MILLIGRAM(S): at 06:42

## 2023-02-05 RX ADMIN — Medication 12.5 MILLIGRAM(S): at 06:17

## 2023-02-05 RX ADMIN — Medication 975 MILLIGRAM(S): at 12:54

## 2023-02-05 RX ADMIN — OXYCODONE HYDROCHLORIDE 10 MILLIGRAM(S): 5 TABLET ORAL at 10:43

## 2023-02-05 RX ADMIN — CHLORHEXIDINE GLUCONATE 1 APPLICATION(S): 213 SOLUTION TOPICAL at 12:39

## 2023-02-05 RX ADMIN — OXYCODONE HYDROCHLORIDE 5 MILLIGRAM(S): 5 TABLET ORAL at 21:30

## 2023-02-05 RX ADMIN — Medication 12.5 MILLIGRAM(S): at 17:56

## 2023-02-05 RX ADMIN — OXYCODONE HYDROCHLORIDE 5 MILLIGRAM(S): 5 TABLET ORAL at 20:30

## 2023-02-05 RX ADMIN — ENOXAPARIN SODIUM 40 MILLIGRAM(S): 100 INJECTION SUBCUTANEOUS at 15:08

## 2023-02-05 RX ADMIN — Medication 250 MILLIGRAM(S): at 09:32

## 2023-02-05 RX ADMIN — Medication 40 MILLIGRAM(S): at 09:32

## 2023-02-05 RX ADMIN — AMIODARONE HYDROCHLORIDE 400 MILLIGRAM(S): 400 TABLET ORAL at 15:07

## 2023-02-05 RX ADMIN — Medication 975 MILLIGRAM(S): at 18:42

## 2023-02-05 RX ADMIN — Medication 250 MILLIGRAM(S): at 20:13

## 2023-02-05 RX ADMIN — Medication 100 MILLIGRAM(S): at 15:07

## 2023-02-05 RX ADMIN — Medication 975 MILLIGRAM(S): at 17:42

## 2023-02-05 RX ADMIN — AMIODARONE HYDROCHLORIDE 400 MILLIGRAM(S): 400 TABLET ORAL at 21:48

## 2023-02-05 RX ADMIN — Medication 100 MILLIGRAM(S): at 00:13

## 2023-02-05 RX ADMIN — POLYETHYLENE GLYCOL 3350 17 GRAM(S): 17 POWDER, FOR SOLUTION ORAL at 12:54

## 2023-02-05 RX ADMIN — Medication 100 MILLIGRAM(S): at 08:16

## 2023-02-05 RX ADMIN — OXYCODONE HYDROCHLORIDE 10 MILLIGRAM(S): 5 TABLET ORAL at 11:43

## 2023-02-05 RX ADMIN — SENNA PLUS 2 TABLET(S): 8.6 TABLET ORAL at 21:48

## 2023-02-05 RX ADMIN — Medication 325 MILLIGRAM(S): at 12:54

## 2023-02-05 RX ADMIN — PANTOPRAZOLE SODIUM 40 MILLIGRAM(S): 20 TABLET, DELAYED RELEASE ORAL at 12:54

## 2023-02-05 NOTE — DIETITIAN INITIAL EVALUATION ADULT - PERTINENT LABORATORY DATA
02-05    139  |  106  |  21.0<H>  ----------------------------<  96  4.4   |  22.0  |  1.13    Ca    8.2<L>      05 Feb 2023 02:25  Mg     2.2     02-05    TPro  5.2<L>  /  Alb  3.8  /  TBili  0.6  /  DBili  0.2  /  AST  46<H>  /  ALT  15  /  AlkPhos  41  02-05  POCT Blood Glucose.: 123 mg/dL (02-05-23 @ 12:49)  A1C with Estimated Average Glucose Result: 6.3 % (02-01-23 @ 12:50)

## 2023-02-05 NOTE — DIETITIAN INITIAL EVALUATION ADULT - NS FNS DIET ORDER
Diet, Regular:   Consistent Carbohydrate {No Snacks} (CSTCHO)  DASH/TLC {Sodium & Cholesterol Restricted} (DASH) (02-05-23 @ 10:36)

## 2023-02-05 NOTE — DIETITIAN INITIAL EVALUATION ADULT - NSFNSGIIOFT_GEN_A_CORE
02-04-23 @ 07:01  -  02-05-23 @ 07:00  --------------------------------------------------------  OUT:    Chest Tube (mL): 570 mL    Chest Tube (mL): 660 mL  Total OUT: 1230 mL    Total NET: -1230 mL      02-05-23 @ 07:01  -  02-05-23 @ 13:46  --------------------------------------------------------  OUT:    Chest Tube (mL): 80 mL    Chest Tube (mL): 70 mL  Total OUT: 150 mL    Total NET: -150 mL

## 2023-02-05 NOTE — DIETITIAN INITIAL EVALUATION ADULT - PERTINENT MEDS FT
MEDICATIONS  (STANDING):  acetaminophen     Tablet .. 975 milliGRAM(s) Oral every 6 hours  aMIOdarone    Tablet   Oral   aMIOdarone    Tablet 400 milliGRAM(s) Oral every 8 hours  aspirin enteric coated 325 milliGRAM(s) Oral daily  atorvastatin 80 milliGRAM(s) Oral at bedtime  cefuroxime  IVPB 1500 milliGRAM(s) IV Intermittent every 8 hours  chlorhexidine 2% Cloths 1 Application(s) Topical daily  dextrose 5%. 1000 milliLiter(s) (100 mL/Hr) IV Continuous <Continuous>  dextrose 5%. 1000 milliLiter(s) (50 mL/Hr) IV Continuous <Continuous>  dextrose 50% Injectable 25 Gram(s) IV Push once  dextrose 50% Injectable 12.5 Gram(s) IV Push once  dextrose 50% Injectable 25 Gram(s) IV Push once  enoxaparin Injectable 40 milliGRAM(s) SubCutaneous every 24 hours  glucagon  Injectable 1 milliGRAM(s) IntraMuscular once  insulin lispro (ADMELOG) corrective regimen sliding scale   SubCutaneous three times a day before meals  metoprolol tartrate 12.5 milliGRAM(s) Oral two times a day  pantoprazole    Tablet 40 milliGRAM(s) Oral daily  polyethylene glycol 3350 17 Gram(s) Oral daily  senna 2 Tablet(s) Oral at bedtime  sodium chloride 0.9%. 1000 milliLiter(s) (10 mL/Hr) IV Continuous <Continuous>  sodium chloride 0.9%. 1000 milliLiter(s) (5 mL/Hr) IV Continuous <Continuous>  vancomycin  IVPB 1000 milliGRAM(s) IV Intermittent every 12 hours    MEDICATIONS  (PRN):  dextrose Oral Gel 15 Gram(s) Oral once PRN Blood Glucose LESS THAN 70 milliGRAM(s)/deciliter  oxyCODONE    IR 5 milliGRAM(s) Oral every 4 hours PRN Moderate Pain (4 - 6)  oxyCODONE    IR 10 milliGRAM(s) Oral four times a day PRN Severe Pain (7 - 10)

## 2023-02-05 NOTE — DIETITIAN INITIAL EVALUATION ADULT - ORAL INTAKE PTA/DIET HISTORY
Pt reported good po intake PTA and currently. Stated -165 lbs, denied recent wt changes. Provided written and verbal heart healthy/DM nutrition education. Food preferences obtained.

## 2023-02-05 NOTE — DIETITIAN INITIAL EVALUATION ADULT - OTHER INFO
72 year old male with a medical history of HTN, CAD (s/p MI in 2016 with PCI to d RCA), HLD, with reported JESSICA, s/p cardiac PET-MRI during cardiac workup revealing a moderate to large perfusion abnormality in the basal anterior and mid anterior regions on the stress images. Patient s/p elective cardiac cath 2/1 at [facility] that showed Triple vessel CAD (85% mLAD, 90% D1, 75% Ostial LCx, and 65%LM stenosis, with patent dRCA stent).  Patient was transferred to [facility] for surgical evaluation. on 2/4 underwent CABGx4 (LIMA-LAD, CVG-PL, SVG-OM, SVG-Diag).

## 2023-02-06 LAB
ANION GAP SERPL CALC-SCNC: 12 MMOL/L — SIGNIFICANT CHANGE UP (ref 5–17)
BUN SERPL-MCNC: 24.1 MG/DL — HIGH (ref 8–20)
CALCIUM SERPL-MCNC: 8.5 MG/DL — SIGNIFICANT CHANGE UP (ref 8.4–10.5)
CHLORIDE SERPL-SCNC: 103 MMOL/L — SIGNIFICANT CHANGE UP (ref 96–108)
CO2 SERPL-SCNC: 21 MMOL/L — LOW (ref 22–29)
CREAT SERPL-MCNC: 1.2 MG/DL — SIGNIFICANT CHANGE UP (ref 0.5–1.3)
EGFR: 65 ML/MIN/1.73M2 — SIGNIFICANT CHANGE UP
GLUCOSE BLDC GLUCOMTR-MCNC: 123 MG/DL — HIGH (ref 70–99)
GLUCOSE BLDC GLUCOMTR-MCNC: 125 MG/DL — HIGH (ref 70–99)
GLUCOSE BLDC GLUCOMTR-MCNC: 128 MG/DL — HIGH (ref 70–99)
GLUCOSE BLDC GLUCOMTR-MCNC: 128 MG/DL — HIGH (ref 70–99)
GLUCOSE SERPL-MCNC: 129 MG/DL — HIGH (ref 70–99)
HCT VFR BLD CALC: 28.6 % — LOW (ref 39–50)
HGB BLD-MCNC: 10 G/DL — LOW (ref 13–17)
MAGNESIUM SERPL-MCNC: 2 MG/DL — SIGNIFICANT CHANGE UP (ref 1.6–2.6)
MCHC RBC-ENTMCNC: 31.2 PG — SIGNIFICANT CHANGE UP (ref 27–34)
MCHC RBC-ENTMCNC: 35 GM/DL — SIGNIFICANT CHANGE UP (ref 32–36)
MCV RBC AUTO: 89.1 FL — SIGNIFICANT CHANGE UP (ref 80–100)
PLATELET # BLD AUTO: 150 K/UL — SIGNIFICANT CHANGE UP (ref 150–400)
POTASSIUM SERPL-MCNC: 4.7 MMOL/L — SIGNIFICANT CHANGE UP (ref 3.5–5.3)
POTASSIUM SERPL-SCNC: 4.7 MMOL/L — SIGNIFICANT CHANGE UP (ref 3.5–5.3)
RBC # BLD: 3.21 M/UL — LOW (ref 4.2–5.8)
RBC # FLD: 13.3 % — SIGNIFICANT CHANGE UP (ref 10.3–14.5)
SODIUM SERPL-SCNC: 136 MMOL/L — SIGNIFICANT CHANGE UP (ref 135–145)
WBC # BLD: 13.61 K/UL — HIGH (ref 3.8–10.5)
WBC # FLD AUTO: 13.61 K/UL — HIGH (ref 3.8–10.5)

## 2023-02-06 PROCEDURE — 71045 X-RAY EXAM CHEST 1 VIEW: CPT | Mod: 26,76

## 2023-02-06 PROCEDURE — 99024 POSTOP FOLLOW-UP VISIT: CPT

## 2023-02-06 PROCEDURE — 93010 ELECTROCARDIOGRAM REPORT: CPT | Mod: 76

## 2023-02-06 PROCEDURE — 99291 CRITICAL CARE FIRST HOUR: CPT | Mod: FS

## 2023-02-06 RX ORDER — SODIUM CHLORIDE 9 MG/ML
3 INJECTION INTRAMUSCULAR; INTRAVENOUS; SUBCUTANEOUS EVERY 8 HOURS
Refills: 0 | Status: DISCONTINUED | OUTPATIENT
Start: 2023-02-06 | End: 2023-02-09

## 2023-02-06 RX ORDER — AMLODIPINE BESYLATE 2.5 MG/1
10 TABLET ORAL DAILY
Refills: 0 | Status: DISCONTINUED | OUTPATIENT
Start: 2023-02-06 | End: 2023-02-09

## 2023-02-06 RX ORDER — METOPROLOL TARTRATE 50 MG
25 TABLET ORAL ONCE
Refills: 0 | Status: COMPLETED | OUTPATIENT
Start: 2023-02-06 | End: 2023-02-06

## 2023-02-06 RX ORDER — METOPROLOL TARTRATE 50 MG
12.5 TABLET ORAL ONCE
Refills: 0 | Status: COMPLETED | OUTPATIENT
Start: 2023-02-06 | End: 2023-02-06

## 2023-02-06 RX ORDER — METOPROLOL TARTRATE 50 MG
12.5 TABLET ORAL ONCE
Refills: 0 | Status: DISCONTINUED | OUTPATIENT
Start: 2023-02-06 | End: 2023-02-06

## 2023-02-06 RX ORDER — CLOPIDOGREL BISULFATE 75 MG/1
75 TABLET, FILM COATED ORAL DAILY
Refills: 0 | Status: DISCONTINUED | OUTPATIENT
Start: 2023-02-06 | End: 2023-02-09

## 2023-02-06 RX ORDER — LANOLIN ALCOHOL/MO/W.PET/CERES
5 CREAM (GRAM) TOPICAL AT BEDTIME
Refills: 0 | Status: DISCONTINUED | OUTPATIENT
Start: 2023-02-06 | End: 2023-02-09

## 2023-02-06 RX ORDER — METOPROLOL TARTRATE 50 MG
25 TABLET ORAL
Refills: 0 | Status: DISCONTINUED | OUTPATIENT
Start: 2023-02-06 | End: 2023-02-06

## 2023-02-06 RX ORDER — POTASSIUM CHLORIDE 20 MEQ
20 PACKET (EA) ORAL ONCE
Refills: 0 | Status: COMPLETED | OUTPATIENT
Start: 2023-02-06 | End: 2023-02-06

## 2023-02-06 RX ORDER — METOPROLOL TARTRATE 50 MG
50 TABLET ORAL
Refills: 0 | Status: DISCONTINUED | OUTPATIENT
Start: 2023-02-06 | End: 2023-02-09

## 2023-02-06 RX ADMIN — Medication 100 MILLIGRAM(S): at 08:12

## 2023-02-06 RX ADMIN — CLOPIDOGREL BISULFATE 75 MILLIGRAM(S): 75 TABLET, FILM COATED ORAL at 11:38

## 2023-02-06 RX ADMIN — Medication 975 MILLIGRAM(S): at 12:38

## 2023-02-06 RX ADMIN — Medication 100 MILLIGRAM(S): at 00:16

## 2023-02-06 RX ADMIN — AMIODARONE HYDROCHLORIDE 400 MILLIGRAM(S): 400 TABLET ORAL at 06:25

## 2023-02-06 RX ADMIN — Medication 325 MILLIGRAM(S): at 11:39

## 2023-02-06 RX ADMIN — ENOXAPARIN SODIUM 40 MILLIGRAM(S): 100 INJECTION SUBCUTANEOUS at 20:53

## 2023-02-06 RX ADMIN — Medication 5 MILLIGRAM(S): at 20:52

## 2023-02-06 RX ADMIN — Medication 40 MILLIGRAM(S): at 06:25

## 2023-02-06 RX ADMIN — AMIODARONE HYDROCHLORIDE 400 MILLIGRAM(S): 400 TABLET ORAL at 20:52

## 2023-02-06 RX ADMIN — Medication 975 MILLIGRAM(S): at 11:38

## 2023-02-06 RX ADMIN — AMIODARONE HYDROCHLORIDE 400 MILLIGRAM(S): 400 TABLET ORAL at 13:40

## 2023-02-06 RX ADMIN — ATORVASTATIN CALCIUM 80 MILLIGRAM(S): 80 TABLET, FILM COATED ORAL at 20:53

## 2023-02-06 RX ADMIN — PANTOPRAZOLE SODIUM 40 MILLIGRAM(S): 20 TABLET, DELAYED RELEASE ORAL at 11:39

## 2023-02-06 RX ADMIN — Medication 975 MILLIGRAM(S): at 06:24

## 2023-02-06 RX ADMIN — Medication 975 MILLIGRAM(S): at 17:37

## 2023-02-06 RX ADMIN — SODIUM CHLORIDE 3 MILLILITER(S): 9 INJECTION INTRAMUSCULAR; INTRAVENOUS; SUBCUTANEOUS at 13:09

## 2023-02-06 RX ADMIN — SENNA PLUS 2 TABLET(S): 8.6 TABLET ORAL at 20:53

## 2023-02-06 RX ADMIN — AMLODIPINE BESYLATE 10 MILLIGRAM(S): 2.5 TABLET ORAL at 03:20

## 2023-02-06 RX ADMIN — Medication 50 MILLIGRAM(S): at 17:37

## 2023-02-06 RX ADMIN — SODIUM CHLORIDE 3 MILLILITER(S): 9 INJECTION INTRAMUSCULAR; INTRAVENOUS; SUBCUTANEOUS at 22:11

## 2023-02-06 RX ADMIN — Medication 25 MILLIGRAM(S): at 10:26

## 2023-02-06 RX ADMIN — Medication 25 MILLIGRAM(S): at 06:24

## 2023-02-06 RX ADMIN — POLYETHYLENE GLYCOL 3350 17 GRAM(S): 17 POWDER, FOR SOLUTION ORAL at 11:39

## 2023-02-06 RX ADMIN — Medication 12.5 MILLIGRAM(S): at 02:34

## 2023-02-06 RX ADMIN — Medication 250 MILLIGRAM(S): at 09:09

## 2023-02-06 RX ADMIN — Medication 975 MILLIGRAM(S): at 18:37

## 2023-02-06 RX ADMIN — Medication 975 MILLIGRAM(S): at 07:24

## 2023-02-06 RX ADMIN — Medication 20 MILLIEQUIVALENT(S): at 11:38

## 2023-02-06 NOTE — PHYSICAL THERAPY INITIAL EVALUATION ADULT - GENERAL OBSERVATIONS, REHAB EVAL
Pt. in bed; +monitor, O2 nasal canula, pacer box, IV;  alert and cooperative Opioid Counseling: I discussed with the patient the potential side effects of opioids including but not limited to addiction, altered mental status, and depression. I stressed avoiding alcohol, benzodiazepines, muscle relaxants and sleep aids unless specifically okayed by a physician. The patient verbalized understanding of the proper use and possible adverse effects of opioids. All of the patient's questions and concerns were addressed. They were instructed to flush the remaining pills down the toilet if they did not need them for pain.

## 2023-02-07 LAB
ANION GAP SERPL CALC-SCNC: 11 MMOL/L — SIGNIFICANT CHANGE UP (ref 5–17)
BUN SERPL-MCNC: 24.6 MG/DL — HIGH (ref 8–20)
CALCIUM SERPL-MCNC: 8.6 MG/DL — SIGNIFICANT CHANGE UP (ref 8.4–10.5)
CHLORIDE SERPL-SCNC: 102 MMOL/L — SIGNIFICANT CHANGE UP (ref 96–108)
CO2 SERPL-SCNC: 25 MMOL/L — SIGNIFICANT CHANGE UP (ref 22–29)
CREAT SERPL-MCNC: 1.2 MG/DL — SIGNIFICANT CHANGE UP (ref 0.5–1.3)
EGFR: 65 ML/MIN/1.73M2 — SIGNIFICANT CHANGE UP
GLUCOSE BLDC GLUCOMTR-MCNC: 112 MG/DL — HIGH (ref 70–99)
GLUCOSE BLDC GLUCOMTR-MCNC: 114 MG/DL — HIGH (ref 70–99)
GLUCOSE SERPL-MCNC: 104 MG/DL — HIGH (ref 70–99)
HCT VFR BLD CALC: 27.7 % — LOW (ref 39–50)
HGB BLD-MCNC: 9.6 G/DL — LOW (ref 13–17)
MAGNESIUM SERPL-MCNC: 2.1 MG/DL — SIGNIFICANT CHANGE UP (ref 1.6–2.6)
MCHC RBC-ENTMCNC: 30.9 PG — SIGNIFICANT CHANGE UP (ref 27–34)
MCHC RBC-ENTMCNC: 34.7 GM/DL — SIGNIFICANT CHANGE UP (ref 32–36)
MCV RBC AUTO: 89.1 FL — SIGNIFICANT CHANGE UP (ref 80–100)
PLATELET # BLD AUTO: 156 K/UL — SIGNIFICANT CHANGE UP (ref 150–400)
POTASSIUM SERPL-MCNC: 3.6 MMOL/L — SIGNIFICANT CHANGE UP (ref 3.5–5.3)
POTASSIUM SERPL-SCNC: 3.6 MMOL/L — SIGNIFICANT CHANGE UP (ref 3.5–5.3)
RBC # BLD: 3.11 M/UL — LOW (ref 4.2–5.8)
RBC # FLD: 13.3 % — SIGNIFICANT CHANGE UP (ref 10.3–14.5)
SODIUM SERPL-SCNC: 138 MMOL/L — SIGNIFICANT CHANGE UP (ref 135–145)
WBC # BLD: 10.71 K/UL — HIGH (ref 3.8–10.5)
WBC # FLD AUTO: 10.71 K/UL — HIGH (ref 3.8–10.5)

## 2023-02-07 PROCEDURE — 93010 ELECTROCARDIOGRAM REPORT: CPT

## 2023-02-07 PROCEDURE — 71045 X-RAY EXAM CHEST 1 VIEW: CPT | Mod: 26

## 2023-02-07 PROCEDURE — 99024 POSTOP FOLLOW-UP VISIT: CPT

## 2023-02-07 RX ORDER — POTASSIUM CHLORIDE 20 MEQ
40 PACKET (EA) ORAL ONCE
Refills: 0 | Status: COMPLETED | OUTPATIENT
Start: 2023-02-07 | End: 2023-02-07

## 2023-02-07 RX ORDER — ASPIRIN/CALCIUM CARB/MAGNESIUM 324 MG
81 TABLET ORAL DAILY
Refills: 0 | Status: DISCONTINUED | OUTPATIENT
Start: 2023-02-07 | End: 2023-02-09

## 2023-02-07 RX ADMIN — SODIUM CHLORIDE 3 MILLILITER(S): 9 INJECTION INTRAMUSCULAR; INTRAVENOUS; SUBCUTANEOUS at 13:06

## 2023-02-07 RX ADMIN — Medication 975 MILLIGRAM(S): at 05:24

## 2023-02-07 RX ADMIN — Medication 40 MILLIGRAM(S): at 05:25

## 2023-02-07 RX ADMIN — PANTOPRAZOLE SODIUM 40 MILLIGRAM(S): 20 TABLET, DELAYED RELEASE ORAL at 09:17

## 2023-02-07 RX ADMIN — Medication 975 MILLIGRAM(S): at 06:24

## 2023-02-07 RX ADMIN — Medication 5 MILLIGRAM(S): at 21:24

## 2023-02-07 RX ADMIN — Medication 81 MILLIGRAM(S): at 09:17

## 2023-02-07 RX ADMIN — SODIUM CHLORIDE 3 MILLILITER(S): 9 INJECTION INTRAMUSCULAR; INTRAVENOUS; SUBCUTANEOUS at 05:26

## 2023-02-07 RX ADMIN — ATORVASTATIN CALCIUM 80 MILLIGRAM(S): 80 TABLET, FILM COATED ORAL at 21:24

## 2023-02-07 RX ADMIN — Medication 40 MILLIEQUIVALENT(S): at 09:16

## 2023-02-07 RX ADMIN — Medication 975 MILLIGRAM(S): at 00:24

## 2023-02-07 RX ADMIN — AMLODIPINE BESYLATE 10 MILLIGRAM(S): 2.5 TABLET ORAL at 05:25

## 2023-02-07 RX ADMIN — ENOXAPARIN SODIUM 40 MILLIGRAM(S): 100 INJECTION SUBCUTANEOUS at 21:26

## 2023-02-07 RX ADMIN — AMIODARONE HYDROCHLORIDE 400 MILLIGRAM(S): 400 TABLET ORAL at 13:09

## 2023-02-07 RX ADMIN — CLOPIDOGREL BISULFATE 75 MILLIGRAM(S): 75 TABLET, FILM COATED ORAL at 09:17

## 2023-02-07 RX ADMIN — Medication 50 MILLIGRAM(S): at 05:26

## 2023-02-07 RX ADMIN — AMIODARONE HYDROCHLORIDE 400 MILLIGRAM(S): 400 TABLET ORAL at 05:23

## 2023-02-07 RX ADMIN — SODIUM CHLORIDE 3 MILLILITER(S): 9 INJECTION INTRAMUSCULAR; INTRAVENOUS; SUBCUTANEOUS at 21:26

## 2023-02-07 RX ADMIN — Medication 975 MILLIGRAM(S): at 01:24

## 2023-02-08 ENCOUNTER — TRANSCRIPTION ENCOUNTER (OUTPATIENT)
Age: 73
End: 2023-02-08

## 2023-02-08 LAB
ANION GAP SERPL CALC-SCNC: 14 MMOL/L — SIGNIFICANT CHANGE UP (ref 5–17)
BUN SERPL-MCNC: 20.1 MG/DL — HIGH (ref 8–20)
CALCIUM SERPL-MCNC: 8.6 MG/DL — SIGNIFICANT CHANGE UP (ref 8.4–10.5)
CHLORIDE SERPL-SCNC: 98 MMOL/L — SIGNIFICANT CHANGE UP (ref 96–108)
CO2 SERPL-SCNC: 23 MMOL/L — SIGNIFICANT CHANGE UP (ref 22–29)
CREAT SERPL-MCNC: 1.14 MG/DL — SIGNIFICANT CHANGE UP (ref 0.5–1.3)
EGFR: 69 ML/MIN/1.73M2 — SIGNIFICANT CHANGE UP
GLUCOSE SERPL-MCNC: 92 MG/DL — SIGNIFICANT CHANGE UP (ref 70–99)
HCT VFR BLD CALC: 28.1 % — LOW (ref 39–50)
HGB BLD-MCNC: 9.9 G/DL — LOW (ref 13–17)
MAGNESIUM SERPL-MCNC: 2 MG/DL — SIGNIFICANT CHANGE UP (ref 1.6–2.6)
MCHC RBC-ENTMCNC: 31.3 PG — SIGNIFICANT CHANGE UP (ref 27–34)
MCHC RBC-ENTMCNC: 35.2 GM/DL — SIGNIFICANT CHANGE UP (ref 32–36)
MCV RBC AUTO: 88.9 FL — SIGNIFICANT CHANGE UP (ref 80–100)
PLATELET # BLD AUTO: 208 K/UL — SIGNIFICANT CHANGE UP (ref 150–400)
POTASSIUM SERPL-MCNC: 3.7 MMOL/L — SIGNIFICANT CHANGE UP (ref 3.5–5.3)
POTASSIUM SERPL-SCNC: 3.7 MMOL/L — SIGNIFICANT CHANGE UP (ref 3.5–5.3)
RBC # BLD: 3.16 M/UL — LOW (ref 4.2–5.8)
RBC # FLD: 13 % — SIGNIFICANT CHANGE UP (ref 10.3–14.5)
SODIUM SERPL-SCNC: 135 MMOL/L — SIGNIFICANT CHANGE UP (ref 135–145)
WBC # BLD: 10.71 K/UL — HIGH (ref 3.8–10.5)
WBC # FLD AUTO: 10.71 K/UL — HIGH (ref 3.8–10.5)

## 2023-02-08 PROCEDURE — 71045 X-RAY EXAM CHEST 1 VIEW: CPT | Mod: 26

## 2023-02-08 PROCEDURE — 93010 ELECTROCARDIOGRAM REPORT: CPT

## 2023-02-08 PROCEDURE — 99024 POSTOP FOLLOW-UP VISIT: CPT

## 2023-02-08 RX ORDER — POTASSIUM CHLORIDE 20 MEQ
40 PACKET (EA) ORAL DAILY
Refills: 0 | Status: DISCONTINUED | OUTPATIENT
Start: 2023-02-08 | End: 2023-02-09

## 2023-02-08 RX ADMIN — ATORVASTATIN CALCIUM 80 MILLIGRAM(S): 80 TABLET, FILM COATED ORAL at 21:09

## 2023-02-08 RX ADMIN — Medication 50 MILLIGRAM(S): at 05:54

## 2023-02-08 RX ADMIN — CLOPIDOGREL BISULFATE 75 MILLIGRAM(S): 75 TABLET, FILM COATED ORAL at 12:00

## 2023-02-08 RX ADMIN — Medication 40 MILLIGRAM(S): at 05:55

## 2023-02-08 RX ADMIN — ENOXAPARIN SODIUM 40 MILLIGRAM(S): 100 INJECTION SUBCUTANEOUS at 21:10

## 2023-02-08 RX ADMIN — Medication 40 MILLIEQUIVALENT(S): at 12:00

## 2023-02-08 RX ADMIN — AMLODIPINE BESYLATE 10 MILLIGRAM(S): 2.5 TABLET ORAL at 05:54

## 2023-02-08 RX ADMIN — Medication 5 MILLIGRAM(S): at 21:09

## 2023-02-08 RX ADMIN — SODIUM CHLORIDE 3 MILLILITER(S): 9 INJECTION INTRAMUSCULAR; INTRAVENOUS; SUBCUTANEOUS at 21:32

## 2023-02-08 RX ADMIN — SENNA PLUS 2 TABLET(S): 8.6 TABLET ORAL at 21:09

## 2023-02-08 RX ADMIN — Medication 81 MILLIGRAM(S): at 11:59

## 2023-02-08 RX ADMIN — PANTOPRAZOLE SODIUM 40 MILLIGRAM(S): 20 TABLET, DELAYED RELEASE ORAL at 12:00

## 2023-02-08 RX ADMIN — SODIUM CHLORIDE 3 MILLILITER(S): 9 INJECTION INTRAMUSCULAR; INTRAVENOUS; SUBCUTANEOUS at 05:53

## 2023-02-08 RX ADMIN — SODIUM CHLORIDE 3 MILLILITER(S): 9 INJECTION INTRAMUSCULAR; INTRAVENOUS; SUBCUTANEOUS at 13:37

## 2023-02-08 NOTE — PROGRESS NOTE ADULT - PROBLEM SELECTOR PROBLEM 2
Carotid artery stenosis

## 2023-02-08 NOTE — PROGRESS NOTE ADULT - ASSESSMENT
72M, pmhx HTN, CAD (s/p MI in 2016 with PCI to d RCA), HLD, with reported JESSICA, s/p cardiac PET-MRI during cardiac workup revealing a moderate to large perfusion abnormality in the basal anterior and mid anterior regions on the stress images. Patient s/p elective cardiac cath 2/1 at [facility] that showed Triple vessel CAD (85% mLAD, 90% D1, 75% Ostial LCx, and 65%LM stenosis, with patent dRCA stent).  Patient was transferred to [facility] for surgical evaluation. on 2/4 underwent CABGx4 (LIMA-LAD, CVG-PL, SVG-OM, SVG-Diag) with Dr. Doe. 
72 year old male with a medical history of HTN, CAD (s/p MI in 2016 with PCI to d RCA), HLD, with reported JESSICA, s/p cardiac PET-MRI during cardiac workup revealing a moderate to large perfusion abnormality in the basal anterior and mid anterior regions on the stress images. Patient s/p elective cardiac cath 2/1 at [facility] that showed Triple vessel CAD (85% mLAD, 90% D1, 75% Ostial LCx, and 65%LM stenosis, with patent dRCA stent).  Patient was transferred to [facility] for surgical evaluation. on 2/4 underwent CABGx4 (LIMA-LAD, CVG-PL, SVG-OM, SVG-Diag). 
72 year old male with a medical history of HTN, CAD (s/p MI in 2016 with PCI to d RCA), HLD, with reported JESSICA, s/p cardiac PET-MRI during cardiac workup revealing a moderate to large perfusion abnormality in the basal anterior and mid anterior regions on the stress images. Patient s/p elective cardiac cath 2/1 at [facility] that showed Triple vessel CAD (85% mLAD, 90% D1, 75% Ostial LCx, and 65%LM stenosis, with patent dRCA stent).  Patient was transferred to [facility] for surgical evaluation. on 2/4 underwent CABGx4 (LIMA-LAD, CVG-PL, SVG-OM, SVG-Diag). 
72M, pmhx HTN, CAD (s/p MI in 2016 with PCI to d RCA), HLD, with reported JESSICA, s/p cardiac PET-MRI during cardiac workup revealing a moderate to large perfusion abnormality in the basal anterior and mid anterior regions on the stress images. Patient s/p elective cardiac cath 2/1 at [facility] that showed Triple vessel CAD (85% mLAD, 90% D1, 75% Ostial LCx, and 65%LM stenosis, with patent dRCA stent).  Patient was transferred to [facility] for surgical evaluation. on 2/4 underwent CABGx4 (LIMA-LAD, CVG-PL, SVG-OM, SVG-Diag) with Dr. Doe. 
72 year old male with a medical history of HTN, CAD (s/p MI in 2016 with PCI to d RCA), HLD, with reported JESSICA, s/p cardiac PET-MRI during cardiac workup revealing a moderate to large perfusion abnormality in the basal anterior and mid anterior regions on the stress images. Patient s/p elective cardiac cath 2/1 at [facility] that showed Triple vessel CAD (85% mLAD, 90% D1, 75% Ostial LCx, and 65%LM stenosis, with patent dRCA stent).  Patient was transferred to [facility] for surgical evaluation.
72 year old male with a medical history of HTN, CAD (s/p MI in 2016 with PCI to d RCA), HLD, with reported JESSICA, s/p cardiac PET-MRI during cardiac workup revealing a moderate to large perfusion abnormality in the basal anterior and mid anterior regions on the stress images. Patient s/p elective cardiac cath 2/1 at [facility] that showed Triple vessel CAD (85% mLAD, 90% D1, 75% Ostial LCx, and 65%LM stenosis, with patent dRCA stent).  Patient was transferred to [facility] for surgical evaluation.

## 2023-02-08 NOTE — PROGRESS NOTE ADULT - PROBLEM SELECTOR PLAN 2
Carotid US revealing >70% Left ICA stenosis.   Vascular surgery following.   CTA Neck recommended. If not significant, will need follow-up duplex outpatient post discharge.
Carotid US revealing >70% Left ICA stenosis.   CTA Neck revealing 65% proximal Left ICA stenosis.   Will need outpatient followup with vascular surgery
Carotid US revealing >70% Left ICA stenosis.   CTA Neck revealing 65% proximal Left ICA stenosis.   Will need outpatient followup with vascular surgery
Carotid US revealing >70% Left ICA stenosis.   CTA Neck revealing 65% proximal Left ICA stenosis.   Vascular surgery following.
Carotid US revealing >70% Left ICA stenosis.   CTA Neck revealing 65% proximal Left ICA stenosis.   Will need outpatient followup with vascular surgery
Carotid US revealing >70% Left ICA stenosis.   CTA Neck revealing 65% proximal Left ICA stenosis.   Will need outpatient followup with vascular surgery

## 2023-02-08 NOTE — PROGRESS NOTE ADULT - PROBLEM SELECTOR PLAN 4
Continue Lipitor.

## 2023-02-08 NOTE — PROGRESS NOTE ADULT - PROBLEM SELECTOR PLAN 1
oob, ambulate as tolerated, oxy/tylenol PRN pain  wean NC, encourage Incentive Spirometry, chest PT, deep breathing/coughing  hemodynamically stable, cont asa, plavix, lipitor for graft patency  titrate lopressor for HR/BP control  cont norvasc for HTN  tolerating diet, cont bowel regimen, protonix for GI ppx  f/u AM labs/CXR  AMIO load (for intraop PVCs) HELD 2/7 due to bradycardia.     EPM VVI 40/10/0.8  lovenox for DVT ppx  dispo: home 1-2 days pending progress  plan to be d/w CT surgery attending in AM rounds
S/p elective cardiac cath at [facility] showing triple vessel CAD.  Preop workup underway.   TTE, b/l carotid US, labs, etc as above.   Follow up PFTs, UA, EKG, etc.   Patient was taking Plavix prior to arrival. Last P2Y12 157. Repeat pending this AM.   Defer ASA and Plavix with plan for possible surgery.  Continue Lipitor.  Continue Lopressor as tolerated by HR and BP.   Continue DASH/TLC diet.  Plan to be discussed / reviewed with CT Surgery attending / team during AM rounds.
S/p elective cardiac cath at [facility] showing triple vessel CAD.  Preop workup underway.   TTE, b/l carotid US, labs, CXR, PFTs, UA, EKG, etc as above.   Patient was taking Plavix prior to arrival. Last P2Y12 164. Repeat pending this AM.   Defer ASA and Plavix with plan for surgery as per Dr. Doe.  Continue Lipitor.  Continue Lopressor as tolerated by HR and BP.   Continue DASH/TLC diet.  Plan for OR for CABG likely tomorrow.   Plan to be discussed / reviewed with CT Surgery attending / team during AM rounds.
oob, ambulate as tolerated, oxy/tylenol PRN pain  wean NC, encourage Incentive Spirometry, chest PT, deep breathing/coughing  hemodynamically stable, cont asa, plavix lipitor for graft patency--> d/w Dr. Doe decreasing Asa to 81 since on plavix too  titrate lopressor for HR/BP control  cont norvasc for HTN  tolerating diet, cont bowel regimen, protonix for GI ppx  f/u AM labs/CXR  cont amio load for intraop PVCs, EPM VVI 40/10/0.8  lovenox for DVT ppx  dispo: home 1-2 days pending progress  plan to be d/w CT surgery attending in AM rounds
Wean pressor as tolerated  Beta blocker as tolerated by HR and SBP once off pressors  Lipitor for chronic graft patency prophylaxis  Aspirin for acute graft closure prophylaxis  Encourage PO intake  Encourage OOB to chair and ambulation with PT  Encourage deep breathing exercised and coughing  Chest PT and IS use with bedside nurse
Pressors weaned off  Beta blocker as tolerated by HR and SBP   PO amio load initiated per Dr Nader Leo for chronic graft patency prophylaxis  Aspirin for acute graft closure prophylaxis  Encourage PO intake  Encourage OOB to chair and ambulation with PT  Encourage deep breathing exercised and coughing  Chest PT and IS use with bedside nurse

## 2023-02-08 NOTE — PROGRESS NOTE ADULT - PROBLEM SELECTOR PLAN 3
cont lopressor/norvasc, titrate as needed
Hold home dose losartan with plan for possible surgery.  Continue Lopressor as tolerated by HR and BP.
cont lopressor/norvasc, titrate as needed
Hold home dose losartan with plan for possible surgery.  Continue Lopressor as tolerated by HR and BP.
Hold home dose losartan in periop setting.  BB to be resumed once off pressors.
Hold home dose losartan in periop setting.  Continue BB.

## 2023-02-08 NOTE — PROGRESS NOTE ADULT - PROBLEM SELECTOR PLAN 5
SCDs for DVT prophylaxis, chemical ppx with lovenox  Protonix for GI prophylaxis.    Plan to be discussed / reviewed with CT Surgery attending / team during AM rounds.
SCDs for DVT prophylaxis, chemical ppx with lovenox  Protonix for GI prophylaxis.    Plan to be discussed / reviewed with CT Surgery attending / team during AM rounds.
SCDs for DVT prophylaxis. Chemical anticoagulation held in preop setting.   Protonix for GI prophylaxis.    Plan to be discussed / reviewed with CT Surgery attending / team during AM rounds.

## 2023-02-08 NOTE — PROGRESS NOTE ADULT - SUBJECTIVE AND OBJECTIVE BOX
CHERISE GODWIN   MRN#: [id number]     The patient is a 72y Male who was seen, evaluated, & examined with the CTICU staff on rounds and later in the day with a multidisciplinary care plan formulated & implemented.  All available clinical, laboratory, radiographic, pharmacologic, electrocardiographic, and intraoperative data were reviewed & analyzed.      The patient was in the CTICU in critical condition at risk for imminent decompensation secondary to persistent cardiopulmonary dysfunction, hemodynamically significant hypovolemia-shock, intraoperative hemodynamically significant bradycardia, hyperlactatemia, and stress hyperglycemia.      Respiratory status required full ventilatory support, the following of ABG’s with A-line monitoring, continuous pulse oximetry monitoring, continuous ETCO2 monitoring, and an IV Propofol drip for support & to evaluate for & prevent further decompensation secondary to persistent cardiopulmonary dysfunction.  Patient was an acceptable candidate for early extubation, therefore extubated within four hours.        Invasive hemodynamic monitoring with an A-line was required for the following of continuous MAP/BP monitoring to ensure adequate cardiovascular support and to evaluate for & help prevent decompensation while receiving intermittent volume expansion, an IV Levophed drip, external epicardial pacing on VVI back-up, and an intraoperative IV Lidocaine bolus secondary to hemodynamically significant hypovolemia-shock, hyperlactatemia, intraoperative hemodynamically significant bradycardia, and frequent ventricular ectopy.    Metabolic stability, stress hyperglycemia, & infection prophylaxis required an IV regular Insulin drip & the following of serial glucose levels to help achieve & maintain euglycemia.  Based upon my evaluation and review I maintained the current metabolic therapy.    Patient required critical care management and is at risk for life threatening decompensation  I provided 35 minutes of non-continuous care to the patient.  
Subjective - patient seen and evaluated bedside. Sitting comfortably in bed. Admits to mid-sternal incisional pain. Denies  SOB, HA, dizziness, n/v/d    Review of Systems: negative x 10 systems except as noted above    Brief summary:  72yMale POD# 1 CABGx4    Significant/[id number] events: CABG, extubated      PAST MEDICAL & SURGICAL HISTORY:  HTN (hypertension)      HLD (hyperlipidemia)      CAD (coronary artery disease)      Acute myocardial infarction            aspirin enteric coated 325 milliGRAM(s) Oral daily  atorvastatin 80 milliGRAM(s) Oral at bedtime  cefuroxime  IVPB 1500 milliGRAM(s) IV Intermittent every 8 hours  chlorhexidine 2% Cloths 1 Application(s) Topical daily  dextrose 50% Injectable 25 milliLiter(s) IV Push every 15 minutes  dextrose 50% Injectable 50 milliLiter(s) IV Push every 15 minutes  insulin regular Infusion 2 Unit(s)/Hr IV Continuous <Continuous>  norepinephrine Infusion 0.05 MICROgram(s)/kG/Min IV Continuous <Continuous>  pantoprazole    Tablet 40 milliGRAM(s) Oral daily  polyethylene glycol 3350 17 Gram(s) Oral daily  senna 2 Tablet(s) Oral at bedtime  sodium chloride 0.9%. 1000 milliLiter(s) IV Continuous <Continuous>  sodium chloride 0.9%. 1000 milliLiter(s) IV Continuous <Continuous>  vancomycin  IVPB 1000 milliGRAM(s) IV Intermittent every 12 hours  MEDICATIONS  (PRN):    Mode: CPAP with PS, FiO2: 40, PEEP: 5, PS: 5, MAP: 8  Daily     Daily Weight in k (2023 06:00)      ABG - ( 2023 14:24 )  pH, Arterial: 7.310 pH, Blood: x     /  pCO2: 43    /  pO2: 118   / HCO3: 22    / Base Excess: -4.6  /  SaO2: 98.4                                    9.6    28.73 )-----------( 143      ( 2023 12:15 )             27.9   02-04    139  |  109<H>  |  18.5  ----------------------------<  160<H>  4.5   |  20.0<L>  |  1.03    Ca    8.2<L>      2023 12:15  Mg     3.4     02-04    TPro  4.0<L>  /  Alb  2.8<L>  /  TBili  0.5  /  DBili  x   /  AST  39  /  ALT  13  /  AlkPhos  45  02-04    CARDIAC MARKERS ( 2023 12:15 )  x     / 1.84 ng/mL / 449 U/L / x     / 62.1 ng/mL    PT/INR - ( 2023 12:15 )   PT: 15.4 sec;   INR: 1.32 ratio         PTT - ( 2023 12:15 )  PTT:28.1 sec      Objective:  T(C): 37.1 (23 @ 01:00), Max: 37.3 (23 @ 21:00)  HR: 71 (23 @ 01:00) (58 - 89)  BP: 110/58 (23 @ 01:00) (101/58 - 161/80)  RR: 15 (23 @ 01:00) (10 - 28)  SpO2: 97% (23 @ 01:00) (94% - 100%)  Wt(kg): --CAPILLARY BLOOD GLUCOSE      POCT Blood Glucose.: 104 mg/dL (2023 00:54)  POCT Blood Glucose.: 106 mg/dL (2023 23:35)  POCT Blood Glucose.: 123 mg/dL (2023 21:04)  POCT Blood Glucose.: 127 mg/dL (2023 18:58)  POCT Blood Glucose.: 151 mg/dL (2023 17:46)  POCT Blood Glucose.: 159 mg/dL (2023 16:55)  POCT Blood Glucose.: 190 mg/dL (2023 15:50)  POCT Blood Glucose.: 174 mg/dL (2023 13:59)  I&O's Summary    2023 07:01  -  2023 07:00  --------------------------------------------------------  IN: 720 mL / OUT: 300 mL / NET: 420 mL    2023 07:01  -  2023 01:34  --------------------------------------------------------  IN: 1277.9 mL / OUT: 1685 mL / NET: -407.1 mL        Physical Exam  General: NAD  Neuro: A+O x 3, non-focal, speech clear and intact  Psych: Appropriate affect  HEENT:  NCAT, No conjuctival edema or icterus, no thrush.  Pulm: CTA, equal bilaterally  CV: RRR,  +S1S2  Abd: soft, NT, ND, +BS  Ext: +DP Pulses b/l, no edema  Skin: Warm, dry, intact  Inc: MSI C/D/I/stable w/ dressing, LE vein harvest site C/D/I with Ace wrap  Chest tubes: mediastinal and pleural CT to suction, draining appropriately, no AL        Imaging:        < from: Xray Chest 1 View-PORTABLE IMMEDIATE (23 @ 11:53) >    INTERPRETATION:    Heart size and the mediastinum cannot be accurately evaluated on this   projection.  Median sternotomy sutures, surgical clips, and skin staples are seen.  ET tube tip is approximately 2.5 cm above the olga.  Enteric tube extends into left hemiabdomen. Tip not included on image.  Right IJ line with tip at the SVC/right atrial junction.  Mediastinal and pericardial drainsand left chest tube seen.  The right lung is clear.  There is platelike atelectasis in the left mid and lower lung.  There is left retrocardiac opacity with obscuration of the adjacent   hemidiaphragm. There is left costophrenic angle blunting.  No right pleural effusion.  No pneumothorax.  No acute bony abnormality.      IMPRESSION:  Line, tubes, and drains as above.    Left mid and lower lung platelike atelectasis.    Left retrocardiac opacity with obscuration of the adjacent hemidiaphragm   possibly atelectasis or a small left pleural effusion with associated   passive atelectasis.    Left costophrenic angle blunting consistent with left pleural effusion.    --- End of Report ---    < end of copied text >          
CHERISE GODWIN  MRN-[id number]    HPI:  72yr old male PMH HTN, CAD (s/p MI in 2016 with PCI to d RCA), HLD,   Pt reports SOB on exertion, for which he underwent a cardiac PET-MPI that revealed a moderate to large perfusion abnormality in the basal anterior mid anterior regions on the stress images,   He underwent elective cardiac cath today at [facility] that showed Triple vessel CAD (85% Mlad, 90% d1, 75% Ostial Lcx, and 65%LM .  Patent dRCA stent).  Patient was transferred to [facility] for sugical evaluation. (01 Feb 2023 11:56)      Surgery/Hospital Course:  ·  PRE-OP DIAGNOSIS:  CAD (coronary artery disease)   ·  POST-OP DIAGNOSIS:  CAD (coronary artery disease)  ·  PROCEDURES:  CABG, using LIMA and saphenous vein graft, with vein surgical procurement 04-Feb-2023   C4L (lima to lad svg to pl, svg to om, svg to diag sequential to svg graft) Right greater saphenous vein endoscopic harvest   Today:  No acute events   continue Amio  start Plavix tomorrow  increase Lopressor  start norvasc today  xfused 1 U PRBC yesterday          ICU Vital Signs Last 24 Hrs  T(C): 37.1 (06 Feb 2023 04:00), Max: 37.3 (05 Feb 2023 14:00)  T(F): 98.8 (06 Feb 2023 04:00), Max: 99.1 (05 Feb 2023 14:00)  HR: 60 (06 Feb 2023 08:00) (60 - 82)  BP: 104/63 (06 Feb 2023 08:00) (95/50 - 186/81)  BP(mean): 74 (06 Feb 2023 08:00) (67 - 117)  ABP: 200/78 (06 Feb 2023 03:00) (96/42 - 200/78)  ABP(mean): 117 (06 Feb 2023 03:00) (64 - 117)  RR: 22 (06 Feb 2023 08:00) (12 - 26)  SpO2: 94% (06 Feb 2023 08:00) (93% - 99%)    O2 Parameters below as of 06 Feb 2023 08:00  Patient On (Oxygen Delivery Method): nasal cannula  O2 Flow (L/min): 2          Physical Exam:  Gen: A&O   CNS: non focal 	  Neck: no JVD  RES : clear , no wheezing              CVS: Regular  rhythm. Normal S1/S2  Abd: Soft, non-distended. Bowel sounds present.  Skin: No rash.  Ext:  no edema    ============================I/O===========================   I&O's Detail    05 Feb 2023 07:01  -  06 Feb 2023 07:00  --------------------------------------------------------  IN:    IV PiggyBack: 500 mL    IV PiggyBack: 150 mL    Oral Fluid: 1380 mL    sodium chloride 0.9%: 180 mL    sodium chloride 0.9%: 115 mL  Total IN: 2325 mL    OUT:    Chest Tube (mL): 180 mL    Chest Tube (mL): 220 mL    Indwelling Catheter - Urethral (mL): 1905 mL    Insulin: 0 mL  Total OUT: 2305 mL    Total NET: 20 mL      06 Feb 2023 07:01  -  06 Feb 2023 08:41  --------------------------------------------------------  IN:    IV PiggyBack: 50 mL    Oral Fluid: 200 mL  Total IN: 250 mL    OUT:    Voided (mL): 400 mL  Total OUT: 400 mL    Total NET: -150 mL        ============================ LABS =========================                        10.0   13.61 )-----------( 150      ( 06 Feb 2023 02:39 )             28.6     02-06    136  |  103  |  24.1<H>  ----------------------------<  129<H>  4.7   |  21.0<L>  |  1.20    Ca    8.5      06 Feb 2023 02:39  Mg     2.0     02-06    TPro  5.2<L>  /  Alb  3.8  /  TBili  0.6  /  DBili  0.2  /  AST  46<H>  /  ALT  15  /  AlkPhos  41  02-05    LIVER FUNCTIONS - ( 05 Feb 2023 02:25 )  Alb: 3.8 g/dL / Pro: 5.2 g/dL / ALK PHOS: 41 U/L / ALT: 15 U/L / AST: 46 U/L / GGT: x           PT/INR - ( 05 Feb 2023 02:25 )   PT: 14.0 sec;   INR: 1.20 ratio         PTT - ( 04 Feb 2023 12:15 )  PTT:28.1 sec  ABG - ( 04 Feb 2023 14:24 )  pH, Arterial: 7.310 pH, Blood: x     /  pCO2: 43    /  pO2: 118   / HCO3: 22    / Base Excess: -4.6  /  SaO2: 98.4                ======================Micro/Rad/Cardio=================  Culture: Reviewed   CXR: Reviewed  Echo:Reviewed  ======================================================  PAST MEDICAL & SURGICAL HISTORY:  HTN (hypertension)      HLD (hyperlipidemia)      CAD (coronary artery disease)      Acute myocardial infarction        ====================ASSESSMENT ==============  72 year old male with a medical history of HTN, CAD (s/p MI in 2016 with PCI to d RCA), HLD, with reported JESSICA, s/p cardiac PET-MRI during cardiac workup revealing a moderate to large perfusion abnormality in the basal anterior and mid anterior regions on the stress images. Patient s/p elective cardiac cath 2/1 at [facility] that showed Triple vessel CAD (85% mLAD, 90% D1, 75% Ostial LCx, and 65%LM stenosis, with patent dRCA stent).  Patient was transferred to [facility] for surgical evaluation. on 2/4 underwent CABGx4 (LIMA-LAD, CVG-PL, SVG-OM, SVG-Diag).     ---CAD (coronary artery disease).   --- Carotid artery stenosis.   ---HTN (hypertension).   ---HLD (hyperlipidemia).   ---Post op Hypovolemia  ---Post op respiratory insufficiency       Plan:  -Beta blocker as tolerated by HR and SBP   -PO amio load initiated   -Lipitor for chronic graft patency prophylaxis  -Aspirin for acute graft closure prophylaxis  -Chest PT and IS use with bedside nurse.  -Will need outpatient followup with vascular surgery.for carotid stenosis  -SCDs for DVT prophylaxis.   -Protonix for GI prophylaxis.    ====================== NEUROLOGY=====================  acetaminophen     Tablet .. 975 milliGRAM(s) Oral every 6 hours  oxyCODONE    IR 5 milliGRAM(s) Oral every 4 hours PRN Moderate Pain (4 - 6)  oxyCODONE    IR 10 milliGRAM(s) Oral four times a day PRN Severe Pain (7 - 10)    ==================== RESPIRATORY======================  Post op respiratory insufficiency    ====================CARDIOVASCULAR==================  Post op Hypovolemia  aMIOdarone    Tablet   Oral   aMIOdarone    Tablet 400 milliGRAM(s) Oral every 8 hours  amLODIPine   Tablet 10 milliGRAM(s) Oral daily  furosemide    Tablet 40 milliGRAM(s) Oral daily  metoprolol tartrate 50 milliGRAM(s) Oral two times a day  metoprolol tartrate 25 milliGRAM(s) Oral once    ===================HEMATOLOGIC/ONC ===================  Monitor H&H/Plts    aspirin enteric coated 325 milliGRAM(s) Oral daily  enoxaparin Injectable 40 milliGRAM(s) SubCutaneous every 24 hours    ===================== RENAL =========================  Continue monitoring urine output, I&OS, BUN/Cr     ==================== GASTROINTESTINAL===================  dextrose 5%. 1000 milliLiter(s) (100 mL/Hr) IV Continuous <Continuous>  dextrose 5%. 1000 milliLiter(s) (50 mL/Hr) IV Continuous <Continuous>  pantoprazole    Tablet 40 milliGRAM(s) Oral daily  polyethylene glycol 3350 17 Gram(s) Oral daily  senna 2 Tablet(s) Oral at bedtime  sodium chloride 0.9%. 1000 milliLiter(s) (5 mL/Hr) IV Continuous <Continuous>    =======================    ENDOCRINE  =====================  atorvastatin 80 milliGRAM(s) Oral at bedtime  dextrose 50% Injectable 12.5 Gram(s) IV Push once  dextrose 50% Injectable 25 Gram(s) IV Push once  dextrose Oral Gel 15 Gram(s) Oral once PRN Blood Glucose LESS THAN 70 milliGRAM(s)/deciliter  glucagon  Injectable 1 milliGRAM(s) IntraMuscular once  insulin lispro (ADMELOG) corrective regimen sliding scale   SubCutaneous three times a day before meals    ========================INFECTIOUS DISEASE================  vancomycin  IVPB 1000 milliGRAM(s) IV Intermittent every 12 hours      -Monitor Neurologic status ,   -Head of the bed should remain elevated to 45 degrees,  -Monitor for arrhythmias and monitor parameters for organ perfusion,  -Glycemic control is satisfactory,  -Nutritional goals will be met using po eventually , insure adequate caloric intake and monitor the same ,  -Electrolytes have been repleted as necessary , pain control has been achieved  and wound care has been carried out ,  -Stress ulcer and VTE prophylaxis will be achieved,  -Agressive PT and early mobility and ambulation goals will be met,      I have spent 35 minutes providing acute care for this critically ill patient     Patient requires continuous monitoring with bedside rhythm monitoring, pulse ox monitoring, and intermittent blood gas analysis. Care plan discussed with ICU care team. Patient remained critical and at risk for life threatening decompensation.           
no complications from anesthesia observed 
  Preoperative Evaluation for CABG given multivessel CAD    PAST MEDICAL & SURGICAL HISTORY:  HTN (hypertension)  HLD (hyperlipidemia)  CAD (coronary artery disease)  Acute myocardial infarction    Brief Hospital Course: 72 year old male with a medical history of HTN, CAD (s/p MI in 2016 with PCI to d RCA), HLD, with reported JESSICA, s/p cardiac PET-MRI during cardiac workup revealing a moderate to large perfusion abnormality in the basal anterior and mid anterior regions on the stress images. Patient s/p elective cardiac cath 2/1 at [facility] that showed Triple vessel CAD (85% mLAD, 90% D1, 75% Ostial LCx, and 65%LM stenosis, with patent dRCA stent).  Patient was transferred to [facility] for surgical evaluation.    Significant recent/past 24 hr events: No overnight events reported.    Subjective: Patient lying in bed in NAD. +Tolerating diet. +Passing BMs. No pain elicited at this time. Denies fevers, chills, lightheadedness, dizziness, HA, CP, palpitations, SOB, cough, abdominal pain, N/V, diarrhea, numbness/tingling in extremities, or any other acute complaints. ROS negative x 10 systems except as noted above.    MEDICATIONS  (STANDING):  atorvastatin 80 milliGRAM(s) Oral at bedtime  chlorhexidine 0.12% Liquid 15 milliLiter(s) Oral Mucosa two times a day  chlorhexidine 4% Liquid 1 Application(s) Topical two times a day  influenza  Vaccine (HIGH DOSE) 0.7 milliLiter(s) IntraMuscular once  metoprolol tartrate 12.5 milliGRAM(s) Oral two times a day  pantoprazole    Tablet 40 milliGRAM(s) Oral before breakfast  sodium chloride 0.9% lock flush 3 milliLiter(s) IV Push every 8 hours    Allergies: No Known Allergies    Vitals   T(C): 36.9 (03 Feb 2023 00:30), Max: 36.9 (02 Feb 2023 08:00)  T(F): 98.4 (03 Feb 2023 00:30), Max: 98.4 (02 Feb 2023 08:00)  HR: 77 (03 Feb 2023 00:30) (60 - 77)  BP: 149/75 (03 Feb 2023 00:30) (106/62 - 153/72)  RR: 18 (03 Feb 2023 00:30) (16 - 18)  SpO2: 96% (03 Feb 2023 00:30) (96% - 97%)  O2 Parameters below as of 03 Feb 2023 00:30  Patient On (Oxygen Delivery Method): room air    I&O's Detail    01 Feb 2023 07:01  -  02 Feb 2023 07:00  --------------------------------------------------------  IN:    Oral Fluid: 240 mL  Total IN: 240 mL    OUT:    Voided (mL): 550 mL  Total OUT: 550 mL    Total NET: -310 mL      02 Feb 2023 07:01  -  03 Feb 2023 02:56  --------------------------------------------------------  IN:    Oral Fluid: 120 mL  Total IN: 120 mL    OUT:  Total OUT: 0 mL    Total NET: 120 mL    Physical Exam  Neuro: A+O x 3, non-focal, speech clear and intact  HEENT:  NCAT, No conjuctival edema or icterus, no thrush.    Neck:  Supple, trachea midline  Pulm: CTA bilaterally, no rales/rhonchi/wheezing, no accessory muscle use noted  CV: regular rate, regular rhythm, +S1S2, no murmur noted  Abd: soft, NT, ND, + BS  Ext: BELL x 4, no edema, no cyanosis, distal motor/neuro/circ intact  Skin: warm, dry, well perfused    LABS                        13.7   9.33  )-----------( 302      ( 02 Feb 2023 04:40 )             40.5     02-02    137  |  105  |  20.0  ----------------------------<  94  4.1   |  21.0<L>  |  1.21    Ca    9.6      02 Feb 2023 04:40    TPro  6.6  /  Alb  4.1  /  TBili  0.6  /  DBili  x   /  AST  18  /  ALT  17  /  AlkPhos  79  02-02    A1C with Estimated Average Glucose (02.01.23 @ 12:50)    A1C with Estimated Average Glucose Result: 6.3 %    Estimated Average Glucose: 134 mg/dL    PT/INR - ( 01 Feb 2023 12:50 )   PT: 11.8 sec;   INR: 1.02 ratio      PTT - ( 01 Feb 2023 12:50 )  PTT:30.8 sec    P2Y12 Plt Response Test (02.01.23 @ 12:50)    P2Y12 Plt Reactivity: 157    P2Y12 Plt Response Test . (02.02.23 @ 04:40)    P2Y12 Plt Reactivity: 164     LIVER FUNCTIONS - ( 01 Feb 2023 12:50 )  Alb: 4.0 g/dL / Pro: 6.4 g/dL / ALK PHOS: 79 U/L / ALT: 15 U/L / AST: 15 U/L / GGT: x           Thyroid Stimulating Hormone, Serum: 1.13 uIU/mL (02-01 @ 12:50)    Serum Pro-Brain Natriuretic Peptide: 105 pg/mL (02-01 @ 12:50)    Prealbumin, Serum: 29 mg/dL (02-01 @ 12:50)    CXR:  < from: Xray Chest 1 View AP/PA. (02.01.23 @ 13:15) >  Heart magnified by technique.  Lungs remain clear.  Chest is similar to September 2, 2016.  IMPRESSION: No acute finding or change.  < end of copied text >    EKG:    < from: 12 Lead ECG (02.01.23 @ 07:01) >  Ventricular Rate 65 BPM  Atrial Rate 65 BPM  P-R Interval 152 ms  QRS Duration 104 ms  Q-T Interval 398 ms  QTC Calculation(Bazett) 413 ms  P Axis 63 degrees  R Axis 24 degrees  T Axis 41 degrees  Diagnosis Line Normal sinus rhythm  Inferior infarct (cited on or before 02-SEP-2016)  Abnormal ECG  When compared with ECG of 03-SEP-2016 06:33,  T wave inversion no longer evident in Inferior leads  < end of copied text >    Carotid Duplex:  < from: US Duplex Carotid Arteries Complete, Bilateral (02.01.23 @ 14:09) >  Antegrade flow is noted within both vertebral arteries.  IMPRESSION: Greater than 70% left internal carotid artery stenosis.  < end of copied text >    PFT's: Printed in chart.      Echo:  < from: TTE Echo Complete w/ Contrast w/ Doppler (02.01.23 @ 13:53) >  Summary:   1. Left ventricular ejection fraction, by visual estimation, is 55 to 60%.   2. Normal global left ventricular systolic function.   3. Basal inferoseptal segment and basal inferior segment are abnormal as described above.   4. Spectral Doppler shows impaired relaxation pattern of left ventricular myocardial filling (Grade I diastolic dysfunction).   5. Mild mitral annular calcification.   6. Thickening of the anterior and posterior mitral valve leaflets.   7. Trace mitral valve regurgitation.   8. Mild aortic regurgitation.   9. Sclerotic aortic valve with normal opening.  < end of copied text >    Cath report:  < from: Cardiac Catheterization (02.01.23 @ 08:41) >  Cardiac Arteries and Lesion Findings  LMCA: Diffuse irregularity. Moderate plaque in proximal and distal LM, with at least 65% stenosis in pLM  LAD: Diffuse irregularity. Severe stenosis of 85% in mLAD bifurcating with diagonal branch Severely calcified Severe 90% stenosis in D1  LCx: Diffuse irregularity. There is a 75% stenosis in ostial LCx  RCA: Diffuse irregularity. There is a 85% focal stenosis in mRCA Patent stent in dRCA  Impression  Diagnostic Conclusions  Three vessel disease with LM, LAD, Diagonal , LCx and RCA disease  Recommendations  Aggressive medical management of coronary artery disease and its underlying risk factors. Urgent CABG is recommended.  < end of copied text >
Subjective - patient seen and evaluated bedside. Sitting comfortably in bed. Admits to midsternal incisional pain. Denies  SOB, HA, dizziness, n/v/d    Review of Systems: negative x 10 systems except as noted above    Brief summary:  73yMale POD#   2 CABGx4    Significant/[id number] events:      PAST MEDICAL & SURGICAL HISTORY:  HTN (hypertension)      HLD (hyperlipidemia)      CAD (coronary artery disease)      Acute myocardial infarction            acetaminophen     Tablet .. 975 milliGRAM(s) Oral every 6 hours  aMIOdarone    Tablet 400 milliGRAM(s) Oral every 8 hours  aMIOdarone    Tablet   Oral   aspirin enteric coated 325 milliGRAM(s) Oral daily  atorvastatin 80 milliGRAM(s) Oral at bedtime  cefuroxime  IVPB 1500 milliGRAM(s) IV Intermittent every 8 hours  chlorhexidine 2% Cloths 1 Application(s) Topical daily  dextrose 5%. 1000 milliLiter(s) IV Continuous <Continuous>  dextrose 5%. 1000 milliLiter(s) IV Continuous <Continuous>  dextrose 50% Injectable 12.5 Gram(s) IV Push once  dextrose 50% Injectable 25 Gram(s) IV Push once  dextrose Oral Gel 15 Gram(s) Oral once PRN  enoxaparin Injectable 40 milliGRAM(s) SubCutaneous every 24 hours  furosemide    Tablet 40 milliGRAM(s) Oral daily  glucagon  Injectable 1 milliGRAM(s) IntraMuscular once  insulin lispro (ADMELOG) corrective regimen sliding scale   SubCutaneous three times a day before meals  metoprolol tartrate 12.5 milliGRAM(s) Oral two times a day  oxyCODONE    IR 5 milliGRAM(s) Oral every 4 hours PRN  oxyCODONE    IR 10 milliGRAM(s) Oral four times a day PRN  pantoprazole    Tablet 40 milliGRAM(s) Oral daily  polyethylene glycol 3350 17 Gram(s) Oral daily  senna 2 Tablet(s) Oral at bedtime  sodium chloride 0.9%. 1000 milliLiter(s) IV Continuous <Continuous>  sodium chloride 0.9%. 1000 milliLiter(s) IV Continuous <Continuous>  vancomycin  IVPB 1000 milliGRAM(s) IV Intermittent every 12 hours  MEDICATIONS  (PRN):  dextrose Oral Gel 15 Gram(s) Oral once PRN Blood Glucose LESS THAN 70 milliGRAM(s)/deciliter  oxyCODONE    IR 5 milliGRAM(s) Oral every 4 hours PRN Moderate Pain (4 - 6)  oxyCODONE    IR 10 milliGRAM(s) Oral four times a day PRN Severe Pain (7 - 10)    Height (cm): 170.2 (02-05 @ 01:34)  Weight (kg): 76.1 (02-05 @ 01:34)  BMI (kg/m2): 26.3 (02-05 @ 01:34)  BSA (m2): 1.88 (02-05 @ 01:34)  Daily Height in cm: 170.18 (05 Feb 2023 01:34)    Daily       ABG - ( 04 Feb 2023 14:24 )  pH, Arterial: 7.310 pH, Blood: x     /  pCO2: 43    /  pO2: 118   / HCO3: 22    / Base Excess: -4.6  /  SaO2: 98.4                                    10.1   14.85 )-----------( 158      ( 05 Feb 2023 12:56 )             29.1   02-05    132<L>  |  101  |  23.6<H>  ----------------------------<  119<H>  4.1   |  21.0<L>  |  1.26    Ca    8.1<L>      05 Feb 2023 17:50  Mg     2.1     02-05    TPro  5.2<L>  /  Alb  3.8  /  TBili  0.6  /  DBili  0.2  /  AST  46<H>  /  ALT  15  /  AlkPhos  41  02-05    CARDIAC MARKERS ( 05 Feb 2023 02:25 )  x     / 1.30 ng/mL / 617 U/L / x     / 34.8 ng/mL  CARDIAC MARKERS ( 04 Feb 2023 12:15 )  x     / 1.84 ng/mL / 449 U/L / x     / 62.1 ng/mL    PT/INR - ( 05 Feb 2023 02:25 )   PT: 14.0 sec;   INR: 1.20 ratio         PTT - ( 04 Feb 2023 12:15 )  PTT:28.1 sec      Objective:  T(C): 36.7 (02-05-23 @ 21:00), Max: 37.5 (02-05-23 @ 07:00)  HR: 61 (02-05-23 @ 23:00) (60 - 82)  BP: 135/67 (02-05-23 @ 23:00) (95/50 - 152/72)  RR: 18 (02-05-23 @ 23:00) (12 - 39)  SpO2: 96% (02-05-23 @ 23:00) (92% - 99%)  Wt(kg): --CAPILLARY BLOOD GLUCOSE      POCT Blood Glucose.: 165 mg/dL (05 Feb 2023 21:51)  POCT Blood Glucose.: 137 mg/dL (05 Feb 2023 16:16)  POCT Blood Glucose.: 123 mg/dL (05 Feb 2023 12:49)  POCT Blood Glucose.: 107 mg/dL (05 Feb 2023 08:01)  POCT Blood Glucose.: 112 mg/dL (05 Feb 2023 06:54)  POCT Blood Glucose.: 101 mg/dL (05 Feb 2023 05:07)  POCT Blood Glucose.: 100 mg/dL (05 Feb 2023 03:13)  I&O's Summary    04 Feb 2023 07:01  -  05 Feb 2023 07:00  --------------------------------------------------------  IN: 1360.9 mL / OUT: 2175 mL / NET: -814.1 mL    05 Feb 2023 07:01  -  06 Feb 2023 01:08  --------------------------------------------------------  IN: 1880 mL / OUT: 1930 mL / NET: -50 mL        Physical Exam  General: NAD  Neuro: A+O x 3, non-focal, speech clear and intact  Psych: Appropriate affect  HEENT:  NCAT, No conjuctival edema or icterus, no thrush.  Pulm: CTA, equal bilaterally  CV: RRR,  +S1S2  Abd: soft, NT, ND, +BS  Ext: +DP Pulses b/l, no edema  Skin: Warm, dry, intact  Inc: MSI C/D/I/stable w/ dressing, LE vein harvest site C/D/I with Ace wrap  Chest tubes: pleural CT to suction, draining appropriately, no AL      Imaging:    < from: Xray Chest 1 View-PORTABLE IMMEDIATE (02.04.23 @ 11:53) >  IMPRESSION:  Line, tubes, and drains as above.    Left mid and lower lung platelike atelectasis.    Left retrocardiac opacity with obscuration of the adjacent hemidiaphragm   possibly atelectasis or a small left pleural effusion with associated   passive atelectasis.    Left costophrenic angle blunting consistent with left pleural effusion.    < end of copied text >              
Significant recent/past 24 hr events:  No acute overnight events. Pt remained HD stable without acute complaints.  Pt currently in NAD and denies N/V/D HA, dizziness, blurry vision, numbness/tingling, SOB, cough, chest pain, palpitations, abd pain or urinary sx's.     Subjective:  Review of Systems  ROS negative x 10 systems except as noted above    Patient is a 73y old  Male who presents with a chief complaint of CAD (07 Feb 2023 02:32)    HPI:  72yr old male PMH HTN, CAD (s/p MI in 2016 with PCI to d RCA), HLD,   Pt reports SOB on exertion, for which he underwent a cardiac PET-MPI that revealed a moderate to large perfusion abnormality in the basal anterior mid anterior regions on the stress images,   He underwent elective cardiac cath today at [facility] that showed Triple vessel CAD (85% Mlad, 90% d1, 75% Ostial Lcx, and 65%LM .  Patent dRCA stent).  Patient was transferred to [facility] for sugical evaluation. (01 Feb 2023 11:56)    PAST MEDICAL & SURGICAL HISTORY:  HTN (hypertension)      HLD (hyperlipidemia)      CAD (coronary artery disease)      Acute myocardial infarction        FAMILY HISTORY:      Vitals   ICU Vital Signs Last 24 Hrs  T(C): 37.1 (08 Feb 2023 00:24), Max: 37.1 (07 Feb 2023 12:00)  T(F): 98.8 (08 Feb 2023 00:24), Max: 98.8 (08 Feb 2023 00:24)  HR: 63 (08 Feb 2023 00:24) (48 - 67)  BP: 150/80 (08 Feb 2023 00:24) (99/49 - 156/74)  BP(mean): --  ABP: --  ABP(mean): --  RR: 17 (08 Feb 2023 00:24) (16 - 18)  SpO2: 95% (08 Feb 2023 00:24) (92% - 98%)    O2 Parameters below as of 08 Feb 2023 00:24  Patient On (Oxygen Delivery Method): room air      I&O's Detail    06 Feb 2023 07:01  -  07 Feb 2023 07:00  --------------------------------------------------------  IN:    IV PiggyBack: 50 mL    IV PiggyBack: 250 mL    Oral Fluid: 980 mL  Total IN: 1280 mL    OUT:    Voided (mL): 2010 mL  Total OUT: 2010 mL    Total NET: -730 mL      07 Feb 2023 07:01  -  08 Feb 2023 02:02  --------------------------------------------------------  IN:    Oral Fluid: 890 mL  Total IN: 890 mL    OUT:    Voided (mL): 900 mL  Total OUT: 900 mL    Total NET: -10 mL    LABS                        9.6    10.71 )-----------( 156      ( 07 Feb 2023 05:17 )             27.7     02-07    138  |  102  |  24.6<H>  ----------------------------<  104<H>  3.6   |  25.0  |  1.20    Ca    8.6      07 Feb 2023 05:17  Mg     2.1     02-07      POCT Blood Glucose.: 112 mg/dL (02-07-23 @ 12:09)  POCT Blood Glucose.: 114 mg/dL (02-07-23 @ 07:54)      MEDICATIONS  (STANDING):  amLODIPine   Tablet 10 milliGRAM(s) Oral daily  aspirin enteric coated 81 milliGRAM(s) Oral daily  atorvastatin 80 milliGRAM(s) Oral at bedtime  clopidogrel Tablet 75 milliGRAM(s) Oral daily  enoxaparin Injectable 40 milliGRAM(s) SubCutaneous every 24 hours  furosemide    Tablet 40 milliGRAM(s) Oral daily  melatonin 5 milliGRAM(s) Oral at bedtime  metoprolol tartrate 50 milliGRAM(s) Oral two times a day  pantoprazole    Tablet 40 milliGRAM(s) Oral daily  polyethylene glycol 3350 17 Gram(s) Oral daily  senna 2 Tablet(s) Oral at bedtime  sodium chloride 0.9% lock flush 3 milliLiter(s) IV Push every 8 hours    MEDICATIONS  (PRN):  oxyCODONE    IR 5 milliGRAM(s) Oral every 4 hours PRN Moderate Pain (4 - 6)  oxyCODONE    IR 10 milliGRAM(s) Oral four times a day PRN Severe Pain (7 - 10)      Allergies:  No Known Allergies      Physical Exam  Gen: NAD  Neuro: A&Ox3 non focal speech clear and intact  Pulm: Clear BS b/l. No wheezing, rales, rhonchi noted b/l.  CV: S1S2 RRR no murmurs  Abd: +BS soft NT ND  Extrem/MS: no edema/cyanosis, BELL  skin: WWP no rashes  Incision(s): mid sternal dsg intact, sternum stable no click, RLE dsg intact  EPM VVI 40/10/0.8    Code Status: Full Code     
  Preoperative Evaluation for CABG given multivessel CAD    PAST MEDICAL & SURGICAL HISTORY:  HTN (hypertension)  HLD (hyperlipidemia)  CAD (coronary artery disease)  Acute myocardial infarction    Brief Hospital Course: 72 year old male with a medical history of HTN, CAD (s/p MI in 2016 with PCI to d RCA), HLD, with reported JESSICA, s/p cardiac PET-MRI during cardiac workup revealing a moderate to large perfusion abnormality in the basal anterior and mid anterior regions on the stress images. Patient s/p elective cardiac cath 2/1 at [facility] that showed Triple vessel CAD (85% mLAD, 90% D1, 75% Ostial LCx, and 65%LM stenosis, with patent dRCA stent).  Patient was transferred to [facility] for surgical evaluation.    Significant recent/past 24 hr events: No overnight events reported.    Subjective: Patient lying in bed in NAD. +Tolerating diet. +Passing BMs. No pain elicited at this time. Denies fevers, chills, lightheadedness, dizziness, HA, CP, palpitations, SOB, cough, abdominal pain, N/V, diarrhea, numbness/tingling in extremities, or any other acute complaints. ROS negative x 10 systems except as noted above.    MEDICATIONS  (STANDING):  atorvastatin 80 milliGRAM(s) Oral at bedtime  chlorhexidine 0.12% Liquid 15 milliLiter(s) Oral Mucosa two times a day  chlorhexidine 4% Liquid 1 Application(s) Topical two times a day  metoprolol tartrate 12.5 milliGRAM(s) Oral two times a day  pantoprazole    Tablet 40 milliGRAM(s) Oral before breakfast  sodium chloride 0.9% lock flush 3 milliLiter(s) IV Push every 8 hours    Allergies: No Known Allergies    Vitals   T(C): 36.6 (01 Feb 2023 21:43), Max: 36.7 (01 Feb 2023 11:42)  T(F): 97.9 (01 Feb 2023 21:43), Max: 98 (01 Feb 2023 11:42)  HR: 62 (01 Feb 2023 21:43) (62 - 65)  BP: 132/74 (01 Feb 2023 21:43) (132/74 - 157/76)  RR: 16 (01 Feb 2023 21:43) (15 - 16)  SpO2: 96% (01 Feb 2023 21:43) (96% - 99%)  O2 Parameters below as of 01 Feb 2023 21:43  Patient On (Oxygen Delivery Method): room air    Physical Exam  Neuro: A+O x 3, non-focal, speech clear and intact  HEENT:  NCAT, No conjuctival edema or icterus, no thrush.    Neck:  Supple, trachea midline  Pulm: CTA bilaterally, no rales/rhonchi/wheezing, no accessory muscle use noted  CV: regular rate, regular rhythm, +S1S2, no murmur noted  Abd: soft, NT, ND, + BS  Ext: BELL x 4, no edema, no cyanosis, distal motor/neuro/circ intact  Skin: warm, dry, well perfused    LABS                        12.8   8.84  )-----------( 260      ( 01 Feb 2023 12:50 )             37.2     02-01    140  |  106  |  22.2<H>  ----------------------------<  102<H>  4.7   |  26.0  |  1.13    Ca    9.2      01 Feb 2023 12:50    TPro  6.4<L>  /  Alb  4.0  /  TBili  0.4  /  DBili  x   /  AST  15  /  ALT  15  /  AlkPhos  79  02-01    A1C with Estimated Average Glucose (02.01.23 @ 12:50)    A1C with Estimated Average Glucose Result: 6.3 %    Estimated Average Glucose: 134 mg/dL    PT/INR - ( 01 Feb 2023 12:50 )   PT: 11.8 sec;   INR: 1.02 ratio      PTT - ( 01 Feb 2023 12:50 )  PTT:30.8 sec    P2Y12 Plt Response Test (02.01.23 @ 12:50)    P2Y12 Plt Reactivity: 157    LIVER FUNCTIONS - ( 01 Feb 2023 12:50 )  Alb: 4.0 g/dL / Pro: 6.4 g/dL / ALK PHOS: 79 U/L / ALT: 15 U/L / AST: 15 U/L / GGT: x           Thyroid Stimulating Hormone, Serum: 1.13 uIU/mL (02-01 @ 12:50)    Serum Pro-Brain Natriuretic Peptide: 105 pg/mL (02-01 @ 12:50)    Prealbumin, Serum: 29 mg/dL (02-01 @ 12:50)      CXR:  < from: Xray Chest 1 View AP/PA. (02.01.23 @ 13:15) >  Heart magnified by technique.  Lungs remain clear.  Chest is similar to September 2, 2016.  IMPRESSION: No acute finding or change.  < end of copied text >    EKG: Ordered and pending.     Carotid Duplex:  < from: US Duplex Carotid Arteries Complete, Bilateral (02.01.23 @ 14:09) >  Antegrade flow is noted within both vertebral arteries.  IMPRESSION: Greater than 70% left internal carotid artery stenosis.  < end of copied text >    PFT's: Ordered and pending.     Echo:  < from: TTE Echo Complete w/ Contrast w/ Doppler (02.01.23 @ 13:53) >  Summary:   1. Left ventricular ejection fraction, by visual estimation, is 55 to 60%.   2. Normal global left ventricular systolic function.   3. Basal inferoseptal segment and basal inferior segment are abnormal as described above.   4. Spectral Doppler shows impaired relaxation pattern of left ventricular myocardial filling (Grade I diastolic dysfunction).   5. Mild mitral annular calcification.   6. Thickening of the anterior and posterior mitral valve leaflets.   7. Trace mitral valve regurgitation.   8. Mild aortic regurgitation.   9. Sclerotic aortic valve with normal opening.  < end of copied text >    Cath report:  < from: Cardiac Catheterization (02.01.23 @ 08:41) >  Cardiac Arteries and Lesion Findings  LMCA: Diffuse irregularity. Moderate plaque in proximal and distal LM, with at least 65% stenosis in pLM  LAD: Diffuse irregularity. Severe stenosis of 85% in mLAD bifurcating with diagonal branch Severely calcified Severe 90% stenosis in D1  LCx: Diffuse irregularity. There is a 75% stenosis in ostial LCx  RCA: Diffuse irregularity. There is a 85% focal stenosis in mRCA Patent stent in dRCA  Impression  Diagnostic Conclusions  Three vessel disease with LM, LAD, Diagonal , LCx and RCA disease  Recommendations  Aggressive medical management of coronary artery disease and its underlying risk factors. Urgent CABG is recommended.  < end of copied text >  
POD 3 s/p C4L (LIMA-LAD, SVG-PL, SVG-OM, SVG-diag, sequential to SVG graft)     Subjective: no complaints denies CP, palpitations, SOB, cough, fever, chills, itchiness/rash, diaphoresis, vision changes, HA, dizziness/lightheadedness, numbness/tingling, abd pain, N/V     T(C): 36.3 (02-06-23 @ 17:00), Max: 37.1 (02-06-23 @ 04:00)  HR: 54 (02-07-23 @ 00:26) (54 - 70)  BP: 114/38 (02-07-23 @ 00:26) (103/59 - 186/81)  ABP: 200/78 (02-06-23 @ 03:00) (200/78 - 200/78)  ABP(mean): 117 (02-06-23 @ 03:00) (117 - 117)  RR: 18 (02-07-23 @ 00:26) (13 - 25)  SpO2: 97% (02-07-23 @ 00:26) (92% - 97%)    02-06    136  |  103  |  24.1<H>  ----------------------------<  129<H>  4.7   |  21.0<L>  |  1.20    Ca    8.5      06 Feb 2023 02:39  Mg     2.0     02-06                                 10.0   13.61 )-----------( 150      ( 06 Feb 2023 02:39 )             28.6                    CAPILLARY BLOOD GLUCOSE  POCT Blood Glucose.: 128 mg/dL (06 Feb 2023 21:21)  POCT Blood Glucose.: 123 mg/dL (06 Feb 2023 17:30)  POCT Blood Glucose.: 125 mg/dL (06 Feb 2023 11:35)  POCT Blood Glucose.: 128 mg/dL (06 Feb 2023 07:46)    I&O's Detail    05 Feb 2023 07:01  -  06 Feb 2023 07:00  --------------------------------------------------------  IN:    IV PiggyBack: 500 mL    IV PiggyBack: 150 mL    Oral Fluid: 1380 mL    sodium chloride 0.9%: 180 mL    sodium chloride 0.9%: 115 mL  Total IN: 2325 mL    OUT:    Chest Tube (mL): 180 mL    Chest Tube (mL): 220 mL    Indwelling Catheter - Urethral (mL): 1905 mL    Insulin: 0 mL  Total OUT: 2305 mL  Total NET: 20 mL    06 Feb 2023 07:01  -  07 Feb 2023 02:33  --------------------------------------------------------  IN:    IV PiggyBack: 50 mL    IV PiggyBack: 250 mL    Oral Fluid: 500 mL  Total IN: 800 mL    OUT:    Voided (mL): 1570 mL  Total OUT: 1570 mL  Total NET: -770 mL    Drug Dosing Weight  Height (cm): 170.2 (06 Feb 2023 01:07)  Weight (kg): 76.1 (06 Feb 2023 01:07)  BMI (kg/m2): 26.3 (06 Feb 2023 01:07)  BSA (m2): 1.88 (06 Feb 2023 01:07)    MEDICATIONS  (STANDING):  acetaminophen     Tablet .. 975 milliGRAM(s) Oral every 6 hours  aMIOdarone    Tablet   Oral   aMIOdarone    Tablet 400 milliGRAM(s) Oral every 8 hours  amLODIPine   Tablet 10 milliGRAM(s) Oral daily  aspirin enteric coated 325 milliGRAM(s) Oral daily  atorvastatin 80 milliGRAM(s) Oral at bedtime  clopidogrel Tablet 75 milliGRAM(s) Oral daily  dextrose 5%. 1000 milliLiter(s) (100 mL/Hr) IV Continuous <Continuous>  dextrose 5%. 1000 milliLiter(s) (50 mL/Hr) IV Continuous <Continuous>  dextrose 50% Injectable 12.5 Gram(s) IV Push once  dextrose 50% Injectable 25 Gram(s) IV Push once  enoxaparin Injectable 40 milliGRAM(s) SubCutaneous every 24 hours  furosemide    Tablet 40 milliGRAM(s) Oral daily  glucagon  Injectable 1 milliGRAM(s) IntraMuscular once  insulin lispro (ADMELOG) corrective regimen sliding scale   SubCutaneous three times a day before meals  melatonin 5 milliGRAM(s) Oral at bedtime  metoprolol tartrate 50 milliGRAM(s) Oral two times a day  pantoprazole    Tablet 40 milliGRAM(s) Oral daily  polyethylene glycol 3350 17 Gram(s) Oral daily  senna 2 Tablet(s) Oral at bedtime  sodium chloride 0.9% lock flush 3 milliLiter(s) IV Push every 8 hours    MEDICATIONS  (PRN):  dextrose Oral Gel 15 Gram(s) Oral once PRN Blood Glucose LESS THAN 70 milliGRAM(s)/deciliter  oxyCODONE    IR 5 milliGRAM(s) Oral every 4 hours PRN Moderate Pain (4 - 6)  oxyCODONE    IR 10 milliGRAM(s) Oral four times a day PRN Severe Pain (7 - 10)    Physical Exam  Gen: NAD  Neuro: A&Ox3 non focal speech clear and intact  Pulm: decreased BS b/l no wheezing  CV: S1S2 RRR no murmurs  Abd: +BS soft NT ND  Extrem/MS: no edema/cyanosis, BELL  skin: WWP no rashes  Incision(s): mid sternal dsg intact, sternum stable no click, RLE dsg intact  EPM VVI 40/10/0.8

## 2023-02-08 NOTE — PROGRESS NOTE ADULT - PROVIDER SPECIALTY LIST ADULT
Critical Care
Anesthesia
Critical Care
CT Surgery

## 2023-02-09 ENCOUNTER — TRANSCRIPTION ENCOUNTER (OUTPATIENT)
Age: 73
End: 2023-02-09

## 2023-02-09 VITALS
OXYGEN SATURATION: 95 % | RESPIRATION RATE: 18 BRPM | SYSTOLIC BLOOD PRESSURE: 102 MMHG | DIASTOLIC BLOOD PRESSURE: 62 MMHG | HEART RATE: 62 BPM | TEMPERATURE: 98 F

## 2023-02-09 LAB
ANION GAP SERPL CALC-SCNC: 11 MMOL/L — SIGNIFICANT CHANGE UP (ref 5–17)
BUN SERPL-MCNC: 20.9 MG/DL — HIGH (ref 8–20)
CALCIUM SERPL-MCNC: 8.9 MG/DL — SIGNIFICANT CHANGE UP (ref 8.4–10.5)
CHLORIDE SERPL-SCNC: 99 MMOL/L — SIGNIFICANT CHANGE UP (ref 96–108)
CO2 SERPL-SCNC: 26 MMOL/L — SIGNIFICANT CHANGE UP (ref 22–29)
CREAT SERPL-MCNC: 1.21 MG/DL — SIGNIFICANT CHANGE UP (ref 0.5–1.3)
EGFR: 64 ML/MIN/1.73M2 — SIGNIFICANT CHANGE UP
GLUCOSE SERPL-MCNC: 103 MG/DL — HIGH (ref 70–99)
HCT VFR BLD CALC: 31.1 % — LOW (ref 39–50)
HGB BLD-MCNC: 10.8 G/DL — LOW (ref 13–17)
MAGNESIUM SERPL-MCNC: 2 MG/DL — SIGNIFICANT CHANGE UP (ref 1.6–2.6)
MCHC RBC-ENTMCNC: 31 PG — SIGNIFICANT CHANGE UP (ref 27–34)
MCHC RBC-ENTMCNC: 34.7 GM/DL — SIGNIFICANT CHANGE UP (ref 32–36)
MCV RBC AUTO: 89.4 FL — SIGNIFICANT CHANGE UP (ref 80–100)
PLATELET # BLD AUTO: 262 K/UL — SIGNIFICANT CHANGE UP (ref 150–400)
POTASSIUM SERPL-MCNC: 4.2 MMOL/L — SIGNIFICANT CHANGE UP (ref 3.5–5.3)
POTASSIUM SERPL-SCNC: 4.2 MMOL/L — SIGNIFICANT CHANGE UP (ref 3.5–5.3)
RBC # BLD: 3.48 M/UL — LOW (ref 4.2–5.8)
RBC # FLD: 13.2 % — SIGNIFICANT CHANGE UP (ref 10.3–14.5)
SODIUM SERPL-SCNC: 136 MMOL/L — SIGNIFICANT CHANGE UP (ref 135–145)
WBC # BLD: 10.91 K/UL — HIGH (ref 3.8–10.5)
WBC # FLD AUTO: 10.91 K/UL — HIGH (ref 3.8–10.5)

## 2023-02-09 PROCEDURE — 94002 VENT MGMT INPAT INIT DAY: CPT

## 2023-02-09 PROCEDURE — 93312 ECHO TRANSESOPHAGEAL: CPT

## 2023-02-09 PROCEDURE — 83735 ASSAY OF MAGNESIUM: CPT

## 2023-02-09 PROCEDURE — 86900 BLOOD TYPING SEROLOGIC ABO: CPT

## 2023-02-09 PROCEDURE — 85014 HEMATOCRIT: CPT

## 2023-02-09 PROCEDURE — 86901 BLOOD TYPING SEROLOGIC RH(D): CPT

## 2023-02-09 PROCEDURE — 85576 BLOOD PLATELET AGGREGATION: CPT

## 2023-02-09 PROCEDURE — C8929: CPT

## 2023-02-09 PROCEDURE — 83880 ASSAY OF NATRIURETIC PEPTIDE: CPT

## 2023-02-09 PROCEDURE — 81001 URINALYSIS AUTO W/SCOPE: CPT

## 2023-02-09 PROCEDURE — 70498 CT ANGIOGRAPHY NECK: CPT

## 2023-02-09 PROCEDURE — 87641 MR-STAPH DNA AMP PROBE: CPT

## 2023-02-09 PROCEDURE — 85610 PROTHROMBIN TIME: CPT

## 2023-02-09 PROCEDURE — 36430 TRANSFUSION BLD/BLD COMPNT: CPT

## 2023-02-09 PROCEDURE — 84443 ASSAY THYROID STIM HORMONE: CPT

## 2023-02-09 PROCEDURE — 82330 ASSAY OF CALCIUM: CPT

## 2023-02-09 PROCEDURE — 84484 ASSAY OF TROPONIN QUANT: CPT

## 2023-02-09 PROCEDURE — 84134 ASSAY OF PREALBUMIN: CPT

## 2023-02-09 PROCEDURE — P9016: CPT

## 2023-02-09 PROCEDURE — 82947 ASSAY GLUCOSE BLOOD QUANT: CPT

## 2023-02-09 PROCEDURE — 93005 ELECTROCARDIOGRAM TRACING: CPT

## 2023-02-09 PROCEDURE — 82553 CREATINE MB FRACTION: CPT

## 2023-02-09 PROCEDURE — 84295 ASSAY OF SERUM SODIUM: CPT

## 2023-02-09 PROCEDURE — 84132 ASSAY OF SERUM POTASSIUM: CPT

## 2023-02-09 PROCEDURE — 82550 ASSAY OF CK (CPK): CPT

## 2023-02-09 PROCEDURE — 82803 BLOOD GASES ANY COMBINATION: CPT

## 2023-02-09 PROCEDURE — 71045 X-RAY EXAM CHEST 1 VIEW: CPT | Mod: 26

## 2023-02-09 PROCEDURE — 93880 EXTRACRANIAL BILAT STUDY: CPT

## 2023-02-09 PROCEDURE — U0003: CPT

## 2023-02-09 PROCEDURE — 94010 BREATHING CAPACITY TEST: CPT

## 2023-02-09 PROCEDURE — 85025 COMPLETE CBC W/AUTO DIFF WBC: CPT

## 2023-02-09 PROCEDURE — U0005: CPT

## 2023-02-09 PROCEDURE — 36415 COLL VENOUS BLD VENIPUNCTURE: CPT

## 2023-02-09 PROCEDURE — 86803 HEPATITIS C AB TEST: CPT

## 2023-02-09 PROCEDURE — 83605 ASSAY OF LACTIC ACID: CPT

## 2023-02-09 PROCEDURE — C1889: CPT

## 2023-02-09 PROCEDURE — 80076 HEPATIC FUNCTION PANEL: CPT

## 2023-02-09 PROCEDURE — 93325 DOPPLER ECHO COLOR FLOW MAPG: CPT

## 2023-02-09 PROCEDURE — 93010 ELECTROCARDIOGRAM REPORT: CPT

## 2023-02-09 PROCEDURE — 86850 RBC ANTIBODY SCREEN: CPT

## 2023-02-09 PROCEDURE — P9045: CPT

## 2023-02-09 PROCEDURE — 80048 BASIC METABOLIC PNL TOTAL CA: CPT

## 2023-02-09 PROCEDURE — C1751: CPT

## 2023-02-09 PROCEDURE — 87640 STAPH A DNA AMP PROBE: CPT

## 2023-02-09 PROCEDURE — 80053 COMPREHEN METABOLIC PANEL: CPT

## 2023-02-09 PROCEDURE — 82435 ASSAY OF BLOOD CHLORIDE: CPT

## 2023-02-09 PROCEDURE — 85027 COMPLETE CBC AUTOMATED: CPT

## 2023-02-09 PROCEDURE — 85018 HEMOGLOBIN: CPT

## 2023-02-09 PROCEDURE — C1769: CPT

## 2023-02-09 PROCEDURE — 71045 X-RAY EXAM CHEST 1 VIEW: CPT

## 2023-02-09 PROCEDURE — 82962 GLUCOSE BLOOD TEST: CPT

## 2023-02-09 PROCEDURE — 93320 DOPPLER ECHO COMPLETE: CPT

## 2023-02-09 PROCEDURE — 83036 HEMOGLOBIN GLYCOSYLATED A1C: CPT

## 2023-02-09 PROCEDURE — 85730 THROMBOPLASTIN TIME PARTIAL: CPT

## 2023-02-09 PROCEDURE — 86923 COMPATIBILITY TEST ELECTRIC: CPT

## 2023-02-09 PROCEDURE — 97163 PT EVAL HIGH COMPLEX 45 MIN: CPT

## 2023-02-09 RX ORDER — LOSARTAN POTASSIUM 100 MG/1
1 TABLET, FILM COATED ORAL
Qty: 0 | Refills: 0 | DISCHARGE

## 2023-02-09 RX ORDER — OXYCODONE HYDROCHLORIDE 5 MG/1
1 TABLET ORAL
Qty: 20 | Refills: 0
Start: 2023-02-09 | End: 2023-02-13

## 2023-02-09 RX ORDER — ATORVASTATIN CALCIUM 80 MG/1
1 TABLET, FILM COATED ORAL
Qty: 30 | Refills: 1
Start: 2023-02-09 | End: 2023-04-09

## 2023-02-09 RX ORDER — ASPIRIN/CALCIUM CARB/MAGNESIUM 324 MG
1 TABLET ORAL
Qty: 30 | Refills: 1
Start: 2023-02-09 | End: 2023-04-09

## 2023-02-09 RX ORDER — ASPIRIN/CALCIUM CARB/MAGNESIUM 324 MG
1 TABLET ORAL
Qty: 0 | Refills: 0 | DISCHARGE

## 2023-02-09 RX ORDER — METOPROLOL TARTRATE 50 MG
1 TABLET ORAL
Qty: 60 | Refills: 1
Start: 2023-02-09 | End: 2023-04-09

## 2023-02-09 RX ORDER — CLOPIDOGREL BISULFATE 75 MG/1
1 TABLET, FILM COATED ORAL
Qty: 30 | Refills: 1
Start: 2023-02-09 | End: 2023-04-09

## 2023-02-09 RX ORDER — CLOPIDOGREL BISULFATE 75 MG/1
1 TABLET, FILM COATED ORAL
Qty: 0 | Refills: 0 | DISCHARGE

## 2023-02-09 RX ORDER — SENNA PLUS 8.6 MG/1
2 TABLET ORAL
Qty: 14 | Refills: 0
Start: 2023-02-09 | End: 2023-02-15

## 2023-02-09 RX ORDER — AMLODIPINE BESYLATE 2.5 MG/1
1 TABLET ORAL
Qty: 30 | Refills: 1
Start: 2023-02-09 | End: 2023-04-09

## 2023-02-09 RX ORDER — ATORVASTATIN CALCIUM 80 MG/1
1 TABLET, FILM COATED ORAL
Qty: 0 | Refills: 0 | DISCHARGE

## 2023-02-09 RX ADMIN — AMLODIPINE BESYLATE 10 MILLIGRAM(S): 2.5 TABLET ORAL at 05:38

## 2023-02-09 RX ADMIN — SODIUM CHLORIDE 3 MILLILITER(S): 9 INJECTION INTRAMUSCULAR; INTRAVENOUS; SUBCUTANEOUS at 05:28

## 2023-02-09 RX ADMIN — Medication 40 MILLIGRAM(S): at 05:38

## 2023-02-09 RX ADMIN — Medication 50 MILLIGRAM(S): at 05:38

## 2023-02-09 NOTE — DISCHARGE NOTE PROVIDER - NSDCCPTREATMENT_GEN_ALL_CORE_FT
PRINCIPAL PROCEDURE  Procedure: CABG, using LIMA and saphenous vein graft, with vein surgical procurement  Findings and Treatment: C4L (lima to lad svg to pl, svg to om, svg to diag sequential to svg graft) Right greater saphenous vein endoscopic harvest

## 2023-02-09 NOTE — DISCHARGE NOTE PROVIDER - NSDCFUADDINST_GEN_ALL_CORE_FT
Please call the Cardiothoracic Surgery office at [phone] if you are experiencing any shortness of breath, chest pain, fevers or chills, drainage from the incisions, persistent nausea, vomiting or if you have any questions about your medications. If the symptoms are severe, call 911 and go to the nearest hospital. You can also call [phone] for an emergency [facility] ambulance, which will take you to the closest [facility].    If you need any assistance for making any appointments for a new consult or referral in any specialty, please call our [facility] Clinical Coordination Center at [phone].

## 2023-02-09 NOTE — DISCHARGE NOTE PROVIDER - NSDCCPCAREPLAN_GEN_ALL_CORE_FT
PRINCIPAL DISCHARGE DIAGNOSIS  Diagnosis: CAD (coronary artery disease)  Assessment and Plan of Treatment: Please refer to discharge instructions in your folder.  Shower daily with soap and water to the incision. No bathing/hot tubs/pools/swimming. No lotions/creams to incisions.  Take all your medications as prescribed.  Keep your follow up appointments.      SECONDARY DISCHARGE DIAGNOSES  Diagnosis: Carotid artery stenosis  Assessment and Plan of Treatment: Take all medications as prescribed and listed on your discharge paperwork.  Please follow up with your Vascular Surgeon 4-6 weeks from discharge for further monitoring.    Diagnosis: HTN (hypertension)  Assessment and Plan of Treatment: Take all medications as prescribed and listed on your discharge paperwork.  Please follow up with your Cardiologist and Primary Care Physician 2-4 weeks from discharge.

## 2023-02-09 NOTE — DISCHARGE NOTE PROVIDER - NSDCFUADDAPPT_GEN_ALL_CORE_FT
Please follow up with Dr. Doe on _____________.     The cardiac surgery office is located at [facility], first floor. Take a left at the end of the lobby until the end of that verdugo (past the elevator bank). Make a left and the office is on your right across from the elevators.    Your Care Navigator Nurse Practitioner will be in touch to see you in your home within a few days from discharge. The Follow Your Heart program can help ensure you understand your medications, discharge instructions and answer any questions you may have at that time. They are also a great source to address concerns during the day and may be reached at [phone].    Please follow up with your Cardiologist and Primary Care Physician 2-4 weeks from discharge. Please follow up with Dr. Doe on Feb 21st, 2023 @ [phone]    The cardiac surgery office is located at [facility], first floor. Take a left at the end of the lobby until the end of that verdugo (past the elevator bank). Make a left and the office is on your right across from the elevators.    Your Care Navigator Nurse Practitioner will be in touch to see you in your home within a few days from discharge. The Follow Your Heart program can help ensure you understand your medications, discharge instructions and answer any questions you may have at that time. They are also a great source to address concerns during the day and may be reached at [phone].    Please follow up with your Cardiologist and Primary Care Physician 2-4 weeks from discharge.

## 2023-02-09 NOTE — DISCHARGE NOTE PROVIDER - HOSPITAL COURSE
73 year old male patient with a medical history of HTN, CAD (s/p MI in 2016 with PCI to dRCA), HLD, carotid stenosis, with reported JESSICA, s/p cardiac PET-MRI during cardiac workup revealing a moderate to large perfusion abnormality in the basal anterior and mid anterior regions on the stress images. Patient s/p elective cardiac cath 2/1 at [facility] that showed Triple vessel CAD (85% mLAD, 90% D1, 75% Ostial LCx, and 65%LM stenosis, with patent dRCA stent).  Patient was transferred to [facility] for surgical evaluation. Patient underwent CABG x 4 (LIMA-LAD, CVG-PL, SVG-OM, SVG-Diag) with Dr. Doe on 2/4/23. Postoperative course significant for ventricular ectopy (started on PO Amio, later d/c'd due to subsequent bradycardia).  Patient remains hemodynamically stable and stable from a respiratory standpoint at this time. Plan for discharge home later today as per Dr. Doe. 73 year old male patient with a medical history of HTN, CAD (s/p MI in 2016 with PCI to dRCA), HLD, carotid stenosis, with reported JESSICA, s/p cardiac PET-MRI during cardiac workup revealing a moderate to large perfusion abnormality in the basal anterior and mid anterior regions on the stress images. Patient s/p elective cardiac cath 2/1 at [facility] that showed Triple vessel CAD (85% mLAD, 90% D1, 75% Ostial LCx, and 65%LM stenosis, with patent dRCA stent).  Patient was transferred to [facility] for surgical evaluation. Patient underwent CABG x 4 (LIMA-LAD, CVG-PL, SVG-OM, SVG-Diag) with Dr. Doe on 2/4/23. Postoperative course significant for ventricular ectopy (started on PO Amio, later d/c'd due to subsequent bradycardia).  Patient remains hemodynamically stable and stable from a respiratory standpoint at this time. Plan for discharge home later today as per Dr. Doe. Pacing wires cut on day of discharge.

## 2023-02-09 NOTE — DISCHARGE NOTE NURSING/CASE MANAGEMENT/SOCIAL WORK - PATIENT PORTAL LINK FT
You can access the FollowMyHealth Patient Portal offered by [facility] by registering at the following website: [email or web address]. By joining [facility]’s FollowMyHealth portal, you will also be able to view your health information using other applications (apps) compatible with our system.

## 2023-02-09 NOTE — DISCHARGE NOTE PROVIDER - NSDCMRMEDTOKEN_GEN_ALL_CORE_FT
aspirin 81 mg oral delayed release tablet: 1 tab(s) orally once a day  atorvastatin 40 mg oral tablet: 1 tab(s) orally once a day  losartan 25 mg oral tablet: 1 tab(s) orally once a day  Plavix 75 mg oral tablet: 1 tab(s) orally once a day   amLODIPine 10 mg oral tablet: 1 tab(s) orally once a day  aspirin 81 mg oral delayed release tablet: 1 tab(s) orally once a day  atorvastatin 80 mg oral tablet: 1 tab(s) orally once a day (at bedtime)  metoprolol tartrate 50 mg oral tablet: 1 tab(s) orally 2 times a day  oxyCODONE 5 mg oral tablet: 1 tab(s) orally every 6 hours, As Needed -Moderate Pain (4 - 6) MDD:4  Plavix 75 mg oral tablet: 1 tab(s) orally once a day  senna leaf extract oral tablet: 2 tab(s) orally once a day (at bedtime) as needed for constipation while taking narcotic pain medication

## 2023-02-09 NOTE — DISCHARGE NOTE NURSING/CASE MANAGEMENT/SOCIAL WORK - NSTRANSFERBELONGINGSRESP_GEN_A_NUR
I called Clive and left a voice mail.  I wanted him to know that I cannot connect with patients in person, but am still here to assist with concerns, questions and assisting with resources.  I left my contact information and encouraged he call anytime.   no

## 2023-02-09 NOTE — DISCHARGE NOTE NURSING/CASE MANAGEMENT/SOCIAL WORK - NSDCPEFALRISK_GEN_ALL_CORE
For information on Fall & Injury Prevention, visit: [email or web address] OR  [email or web address] OR  https://www.cdc.gov/steadi/patient.html

## 2023-02-09 NOTE — DISCHARGE NOTE NURSING/CASE MANAGEMENT/SOCIAL WORK - NSDCFUADDAPPT_GEN_ALL_CORE_FT
Please follow up with Dr. Doe on _____________.     The cardiac surgery office is located at [facility], first floor. Take a left at the end of the lobby until the end of that verdugo (past the elevator bank). Make a left and the office is on your right across from the elevators.    Your Care Navigator Nurse Practitioner will be in touch to see you in your home within a few days from discharge. The Follow Your Heart program can help ensure you understand your medications, discharge instructions and answer any questions you may have at that time. They are also a great source to address concerns during the day and may be reached at [phone].    Please follow up with your Cardiologist and Primary Care Physician 2-4 weeks from discharge.

## 2023-02-09 NOTE — DISCHARGE NOTE PROVIDER - CARE PROVIDERS DIRECT ADDRESSES
Please have her continue her current Coumadin dose and we will recheck in 1 week. ,[email or web address]

## 2023-02-09 NOTE — DISCHARGE NOTE PROVIDER - PROVIDER TOKENS
PROVIDER:[TOKEN:[[id number]]] PROVIDER:[TOKEN:[[id number]],SCHEDULEDAPPT:[02/21/2023],SCHEDULEDAPPTTIME:[09:00 AM]],PROVIDER:[TOKEN:[[id number]]],PROVIDER:[TOKEN:[[id number]]]

## 2023-02-09 NOTE — DISCHARGE NOTE PROVIDER - CARE PROVIDER_API CALL
John Doe)  Surgery; Thoracic and Cardiac Surgery  [address] [place] [address]  Phone: [phone]  Fax: [phone]  Follow Up Time:    John Doe)  Surgery; Thoracic and Cardiac Surgery  [address] [place] [address]  Phone: [phone]  Fax: [phone]  Scheduled Appointment: 02/21/2023 09:00 AM    Isaias Berrios)  Cardiovascular Disease; Interventional Cardiology  [address] [place] [address]  Phone: [phone]  Fax: [phone]  Follow Up Time:     Gigi Cooper)  Surgery Vascular  [address] [place] [address]  Phone: [phone]  Fax: [phone]  Follow Up Time:

## 2023-02-10 ENCOUNTER — APPOINTMENT (OUTPATIENT)
Dept: CARE COORDINATION | Facility: HOME HEALTH | Age: 73
End: 2023-02-10
Payer: MEDICARE

## 2023-02-10 VITALS
RESPIRATION RATE: 16 BRPM | OXYGEN SATURATION: 96 % | HEIGHT: 68 IN | HEART RATE: 62 BPM | DIASTOLIC BLOOD PRESSURE: 58 MMHG | SYSTOLIC BLOOD PRESSURE: 104 MMHG

## 2023-02-10 PROCEDURE — 99024 POSTOP FOLLOW-UP VISIT: CPT

## 2023-02-10 RX ORDER — AMLODIPINE BESYLATE 10 MG/1
10 TABLET ORAL
Refills: 0 | Status: ACTIVE | COMMUNITY

## 2023-02-10 RX ORDER — SENNOSIDES 8.6 MG
8.6 TABLET ORAL
Refills: 0 | Status: ACTIVE | COMMUNITY

## 2023-02-10 RX ORDER — ATORVASTATIN CALCIUM 80 MG/1
80 TABLET, FILM COATED ORAL
Refills: 0 | Status: ACTIVE | COMMUNITY

## 2023-02-10 RX ORDER — ASPIRIN ENTERIC COATED TABLETS 81 MG 81 MG/1
81 TABLET, DELAYED RELEASE ORAL
Refills: 0 | Status: ACTIVE | COMMUNITY

## 2023-02-10 RX ORDER — METOPROLOL TARTRATE 50 MG/1
50 TABLET, FILM COATED ORAL
Qty: 60 | Refills: 0 | Status: ACTIVE | COMMUNITY

## 2023-02-10 RX ORDER — CLOPIDOGREL 75 MG/1
75 TABLET, FILM COATED ORAL
Refills: 0 | Status: ACTIVE | COMMUNITY

## 2023-02-10 NOTE — REASON FOR VISIT
[Follow-Up: _____] : a [unfilled] follow-up visit [Spouse] : spouse [FreeTextEntry1] : FOLLOW YOUR HEART- Transitional Care Management Program- [facility]\par \par

## 2023-02-10 NOTE — PHYSICAL EXAM
[Sclera] : the sclera and conjunctiva were normal [Neck Appearance] : the appearance of the neck was normal [Respiration, Rhythm And Depth] : normal respiratory rhythm and effort [Exaggerated Use Of Accessory Muscles For Inspiration] : no accessory muscle use [Auscultation Breath Sounds / Voice Sounds] : lungs were clear to auscultation bilaterally [Apical Impulse] : the apical impulse was normal [Heart Rate And Rhythm] : heart rate was normal and rhythm regular [Heart Sounds] : normal S1 and S2 [Murmurs] : no murmurs [Chest Visual Inspection Thoracic Asymmetry] : no chest asymmetry [1+] : left 1+ [FreeTextEntry2] : B/L LE without edema, B/L calves soft, NT [Bowel Sounds] : normal bowel sounds [Abdomen Soft] : soft [Abnormal Walk] : normal gait [Skin Color & Pigmentation] : normal skin color and pigmentation [Skin Turgor] : normal skin turgor [] : no rash [FreeTextEntry1] : SVG harvest site without erythema, warmth or drainage. Edges well approximated.  [Oriented To Time, Place, And Person] : oriented to person, place, and time [Impaired Insight] : insight and judgment were intact [Affect] : the affect was normal

## 2023-02-10 NOTE — ASSESSMENT
[FreeTextEntry1] : Pt recovering well at home s/p OHS. Reviewed all medications and dosages with pt understanding. Pt has all medications in home and is taking as prescribed. Pain controlled with current medication regimen. No new symptoms, issues or concerns to report at this time.\par Rev'd diet at length and addressed all questions and concerns

## 2023-02-10 NOTE — HISTORY OF PRESENT ILLNESS
[FreeTextEntry1] : 73 year old male patient with a medical history of HTN, CAD (s/p MI in 2016 \par with PCI to dRCA), HLD, carotid stenosis, with reported JESSICA, s/p cardiac \par PET-MRI during cardiac workup revealing a moderate to large perfusion \par abnormality in the basal anterior and mid anterior regions on the stress \par images. Patient s/p elective cardiac cath 2/1 at [facility] that \par showed Triple vessel CAD (85% mLAD, 90% D1, 75% Ostial LCx, and 65%LM stenosis, \par with patent dRCA stent).  Patient was transferred to [facility] for surgical \par evaluation. Patient underwent CABG x 4 (LIMA-LAD, CVG-PL, SVG-OM, SVG-Diag) \par with Dr. Doe on 2/4/23. Postoperative course significant for ventricular \par ectopy (started on PO Amio, later d/c'd due to subsequent bradycardia). \par Pt remained hemodynamically stable and discharged home with support of spouse/family, home care services and the [facility] team. Initial visit completed in home\par CC "I'm doing ok"\par

## 2023-02-14 PROBLEM — Z95.1 S/P CORONARY ARTERY BYPASS GRAFT X 4: Status: ACTIVE | Noted: 2023-02-14

## 2023-02-15 NOTE — CDI QUERY NOTE - NSCDIOTHERTXTBX_GEN_ALL_CORE_HH
This patient underwent a CABG x 4 on 2/4/23.  This patient is documented with hypovolemia- shock in the 2/4 Critical Care note following Cardiothoracic surgery.  Although documentation states hypovolemia-shock, the patient’s hemodynamics appear to be within normal parameters.  The patient appears to have been weaned off pressor on 2/5/23 @ 1 AM.   A clinical substantiation to support the hypovolemia-shock diagnosis is being sent for further clarification.      Can the hypovolemia- shock be clarified and clinical indicators/support be clarified as well, if ruled in?    - Hypovolemic shock was ruled out; pressor/inotropic support for this patient is routine management post-cardiothoracic surgery  - Hypovolemic shock is ruled in and a postoperative diagnosis (postoperative hypovolemic shock); pressor for this patient is outside of the routine postoperative        management of this post-cardiothoracic surgery patient (please also document any clinical indicators you feel would support this diagnosis)  -Other  -Not clinically relevant    Chart Documentation/Evidence:  CRITICAL CARE 2/4 NOTE:  The patient was in the CTICU in critical condition at risk for imminent decompensation secondary to persistent cardiopulmonary dysfunction, hemodynamically significant hypovolemia-shock, intraoperative hemodynamically significant bradycardia, hyperlactatemia, and stress hyperglycemia.      Respiratory status required full ventilatory support, the following of ABG’s with A-line monitoring, continuous pulse oximetry monitoring, continuous ETCO2 monitoring, and an IV Propofol drip for support & to evaluate for & prevent further decompensation secondary to persistent cardiopulmonary dysfunction.  Patient was an acceptable candidate for early extubation, therefore extubated within four hours.        Invasive hemodynamic monitoring with an A-line was required for the following of continuous MAP/BP monitoring to ensure adequate cardiovascular support and to evaluate for & help prevent decompensation while receiving intermittent volume expansion, an IV Levophed drip, external epicardial pacing on VVI back-up, and an intraoperative IV Lidocaine bolus secondary to hemodynamically significant hypovolemia-shock, hyperlactatemia, intraoperative hemodynamically significant bradycardia, and frequent ventricular ectopy.    Hemodynamics  Cardiac Output:  None  Cardiac Index:  None    Invasive Arterial Blood Pressures and Mean:  117/59  79  2/4 12:00            109/48  68  2/4 21:00             127/55  79  2/5 05:00  116/55  76  2/4 13:00            116/53  74  2/4 21:30             139/64  90  2/5 06:00  118/53  74  2/4 14:00            116/61  80  2/4 22:00             148/65  94  2/5 07:00  108/52  70  2/4 14:30             85/52   64  2/4 22:30             125/58  81  2/5 08:00  102/47  64  2/4 15:15            116/53  73  2/4 23:00             116/52  73  2/5 09:00   93/44   61  2/4 15:38            118/52  74  2/4 23:30             122/55  79  2/5 10:00  113/51  71  2/4 16:30            114/53  72  2/5 00:00             128/60  82  2/5 11:30  122/53  75  2/4 17:00            112/50  69  2/5 01:00             133/63  87  2/5 13:00  106/49  68  2/4 17:30            111/49  68  2/5 01:30             124/57  79  2/5 15:00  108/53  71  2/4 18:00            109/49  68  2/5 02:00             135/45  70  2/5 17:00  117/56  76  2/4 18:30            116/52  73  2/5 02:30             124/54  75  2/5 19:00  127/59  81  2/4 19:00            116/51  72  2/5 03:00             139/51  78  2/5 21:00  128/62  84  2/4 19:30            120/52  74  2/5 03:30             138/59  87  2/5 23:00                          118/54  75  2/4 20:00            127/55  70  2/5 04:00             142/63  89  2/6 01:00  105/52  70  2/4 20:30            114/53  74  2/5 04:30                                         norepinephrine infusion 0.05 microgram/kg/min  0.02   2/4 21:45 to 2/5/23  01:00  0.00   2/4 21:30  0.02   2/4 20:00 to 21:00  0.03   2/4 19:45  0.04   2/4 16:00 to 19:00  0.05   2/4 15:45  0.03   2/4 15:38  0.00   2/4 13:15 to 15:00  0.02   2/4 13:00  0.04   2/4 12:45  0.05   2/4 12:30  0.06   2/4 12:25  0.07   2/4 12:15  0.084 2/4 12:00    Albumin infusion 5% - 250 ml  250  2/4 15:00  250  2/4 17:00  250  2/4 21:00    Thank you.

## 2023-02-21 ENCOUNTER — APPOINTMENT (OUTPATIENT)
Dept: VASCULAR SURGERY | Facility: CLINIC | Age: 73
End: 2023-02-21
Payer: MEDICARE

## 2023-02-21 ENCOUNTER — APPOINTMENT (OUTPATIENT)
Dept: CARDIOTHORACIC SURGERY | Facility: CLINIC | Age: 73
End: 2023-02-21
Payer: MEDICARE

## 2023-02-21 VITALS
OXYGEN SATURATION: 96 % | TEMPERATURE: 97.6 F | SYSTOLIC BLOOD PRESSURE: 114 MMHG | HEIGHT: 66 IN | BODY MASS INDEX: 25.88 KG/M2 | DIASTOLIC BLOOD PRESSURE: 69 MMHG | WEIGHT: 161 LBS | RESPIRATION RATE: 16 BRPM | HEART RATE: 60 BPM

## 2023-02-21 VITALS
BODY MASS INDEX: 23.64 KG/M2 | DIASTOLIC BLOOD PRESSURE: 72 MMHG | RESPIRATION RATE: 16 BRPM | SYSTOLIC BLOOD PRESSURE: 126 MMHG | WEIGHT: 156 LBS | HEART RATE: 61 BPM | HEIGHT: 68 IN | OXYGEN SATURATION: 96 %

## 2023-02-21 DIAGNOSIS — Z95.1 PRESENCE OF AORTOCORONARY BYPASS GRAFT: ICD-10-CM

## 2023-02-21 DIAGNOSIS — I65.22 OCCLUSION AND STENOSIS OF LEFT CAROTID ARTERY: ICD-10-CM

## 2023-02-21 PROCEDURE — 99024 POSTOP FOLLOW-UP VISIT: CPT

## 2023-02-21 PROCEDURE — 99204 OFFICE O/P NEW MOD 45 MIN: CPT

## 2023-02-21 NOTE — ASSESSMENT
[FreeTextEntry1] : Today on exam patient's lungs clear bilaterally, sternum stable, incision clean, dry and intact. SVG site is clean, dry and intact. No peripheral edema noted. Instructed patient on importance of optimal glycemic control, daily showering, daily weights, incentive spirometer use, and increase ambulation as tolerated. Instructed to call office with any signs or symptoms of infection or weight gain of 2 or more pounds in 1 day or 3 or more pounds in 1 week.\par \par PLAN:\par - Continue care with Cardiology \par - Return to care as needed\par \par \par \par \par \par \par I, Dr. Doe, personally performed the evaluation and management (E/M) services for this new patient.  That E/M includes conducting the initial examination, assessing all conditions, and establishing the plan of care.  Today, my ACP, David Herrera NP was here to observe my evaluation and management services for this patient to be followed going forward.\par \par \par \par

## 2023-02-21 NOTE — CONSULT LETTER
[Dear  ___] : Dear  [unfilled], [Courtesy Letter:] : I had the pleasure of seeing your patient, [unfilled], in my office today. [Please see my note below.] : Please see my note below. [Consult Closing:] : Thank you very much for allowing me to participate in the care of this patient.  If you have any questions, please do not hesitate to contact me. [Sincerely,] : Sincerely, [FreeTextEntry2] : Isaias Berrios MD [FreeTextEntry3] : John Doe MD\par , Cardiothoracic Surgery\par , Cardiovascular and Thoracic Surgery\par [facility], [facility], [place]\par [facility]\par [facility]\par [address] [place] [address]\[place] [address]\par Tel. [phone]\par Fax [phone]

## 2023-02-21 NOTE — REASON FOR VISIT
[de-identified] : coronary artery bypass x4: left internal mammary artery to left anterior descending artery, reverse saphenous vein graft to the diagonal artery, reverse saphenous vein graft to the obtuse marginal artery, and reverse saphenous vein graft to the posterior lateral coronary artery. Medistim evaluation of the grafts. [de-identified] : 2/4/23 [de-identified] : Postoperative course significant for ventricular ectopy (started on PO Amio, later d/c'd due to subsequent bradycardia). Patient remains hemodynamically stable and stable from a respiratory standpoint at this time. Plan for discharge home as per Dr. Doe. Pacing wires cut on day of discharge.

## 2023-02-21 NOTE — CHART NOTE - NSCHARTNOTEFT_GEN_A_CORE
CDI Query - Other    -Patient experienced post-operative hypovolemic shock requiring fluid resuscitation and pressor support. Patient received volume expansion with multiple fluid boluses and prbc transfusion while also requiring pressor support. Hypovolemic shock improved and resolved with the fluid rescucitation and the pressors were weaned off. Hypovolemic shock is not unusual in post-operative cardiac surgery patients immediately after surgery.
d/w vascular on 2/2 the results of CTA, per vascular resident, CTA more for outpt followup, no current intervention, pt cleared for surgery from vascular stand point    d/w Dr. Doe

## 2023-02-27 PROBLEM — I65.22 ASYMPTOMATIC STENOSIS OF LEFT CAROTID ARTERY: Status: ACTIVE | Noted: 2023-02-27

## 2023-03-06 ENCOUNTER — TRANSCRIPTION ENCOUNTER (OUTPATIENT)
Age: 73
End: 2023-03-06

## 2023-03-06 ENCOUNTER — NON-APPOINTMENT (OUTPATIENT)
Age: 73
End: 2023-03-06

## 2023-03-07 ENCOUNTER — TRANSCRIPTION ENCOUNTER (OUTPATIENT)
Age: 73
End: 2023-03-07

## 2023-03-13 ENCOUNTER — TRANSCRIPTION ENCOUNTER (OUTPATIENT)
Age: 73
End: 2023-03-13

## 2023-03-28 ENCOUNTER — OUTPATIENT (OUTPATIENT)
Dept: OUTPATIENT SERVICES | Facility: HOSPITAL | Age: 73
LOS: 1 days | End: 2023-03-28
Payer: MEDICARE

## 2023-03-28 VITALS
OXYGEN SATURATION: 97 % | SYSTOLIC BLOOD PRESSURE: 92 MMHG | RESPIRATION RATE: 16 BRPM | WEIGHT: 160.5 LBS | DIASTOLIC BLOOD PRESSURE: 60 MMHG | HEIGHT: 67 IN | TEMPERATURE: 98 F | HEART RATE: 57 BPM

## 2023-03-28 DIAGNOSIS — Z95.5 PRESENCE OF CORONARY ANGIOPLASTY IMPLANT AND GRAFT: Chronic | ICD-10-CM

## 2023-03-28 DIAGNOSIS — Z98.890 OTHER SPECIFIED POSTPROCEDURAL STATES: Chronic | ICD-10-CM

## 2023-03-28 DIAGNOSIS — Z13.89 ENCOUNTER FOR SCREENING FOR OTHER DISORDER: ICD-10-CM

## 2023-03-28 DIAGNOSIS — I10 ESSENTIAL (PRIMARY) HYPERTENSION: ICD-10-CM

## 2023-03-28 DIAGNOSIS — I65.22 OCCLUSION AND STENOSIS OF LEFT CAROTID ARTERY: ICD-10-CM

## 2023-03-28 DIAGNOSIS — Z01.818 ENCOUNTER FOR OTHER PREPROCEDURAL EXAMINATION: ICD-10-CM

## 2023-03-28 DIAGNOSIS — Z95.1 PRESENCE OF AORTOCORONARY BYPASS GRAFT: Chronic | ICD-10-CM

## 2023-03-28 DIAGNOSIS — Z91.89 OTHER SPECIFIED PERSONAL RISK FACTORS, NOT ELSEWHERE CLASSIFIED: ICD-10-CM

## 2023-03-28 DIAGNOSIS — I65.21 OCCLUSION AND STENOSIS OF RIGHT CAROTID ARTERY: ICD-10-CM

## 2023-03-28 DIAGNOSIS — I25.10 ATHEROSCLEROTIC HEART DISEASE OF NATIVE CORONARY ARTERY WITHOUT ANGINA PECTORIS: ICD-10-CM

## 2023-03-28 DIAGNOSIS — Z29.9 ENCOUNTER FOR PROPHYLACTIC MEASURES, UNSPECIFIED: ICD-10-CM

## 2023-03-28 LAB
A1C WITH ESTIMATED AVERAGE GLUCOSE RESULT: 6 % — HIGH (ref 4–5.6)
ANION GAP SERPL CALC-SCNC: 11 MMOL/L — SIGNIFICANT CHANGE UP (ref 5–17)
APTT BLD: 28.6 SEC — SIGNIFICANT CHANGE UP (ref 27.5–35.5)
BASOPHILS # BLD AUTO: 0.06 K/UL — SIGNIFICANT CHANGE UP (ref 0–0.2)
BASOPHILS NFR BLD AUTO: 0.7 % — SIGNIFICANT CHANGE UP (ref 0–2)
BLD GP AB SCN SERPL QL: SIGNIFICANT CHANGE UP
BUN SERPL-MCNC: 22.6 MG/DL — HIGH (ref 8–20)
CALCIUM SERPL-MCNC: 9.6 MG/DL — SIGNIFICANT CHANGE UP (ref 8.4–10.5)
CHLORIDE SERPL-SCNC: 107 MMOL/L — SIGNIFICANT CHANGE UP (ref 96–108)
CO2 SERPL-SCNC: 23 MMOL/L — SIGNIFICANT CHANGE UP (ref 22–29)
COMMENT - BLOOD BANK: SIGNIFICANT CHANGE UP
CREAT SERPL-MCNC: 1.14 MG/DL — SIGNIFICANT CHANGE UP (ref 0.5–1.3)
EGFR: 69 ML/MIN/1.73M2 — SIGNIFICANT CHANGE UP
EOSINOPHIL # BLD AUTO: 0.18 K/UL — SIGNIFICANT CHANGE UP (ref 0–0.5)
EOSINOPHIL NFR BLD AUTO: 2.2 % — SIGNIFICANT CHANGE UP (ref 0–6)
ESTIMATED AVERAGE GLUCOSE: 126 MG/DL — HIGH (ref 68–114)
GLUCOSE SERPL-MCNC: 107 MG/DL — HIGH (ref 70–99)
HCT VFR BLD CALC: 34 % — LOW (ref 39–50)
HGB BLD-MCNC: 11.3 G/DL — LOW (ref 13–17)
IMM GRANULOCYTES NFR BLD AUTO: 0.2 % — SIGNIFICANT CHANGE UP (ref 0–0.9)
INR BLD: 1.03 RATIO — SIGNIFICANT CHANGE UP (ref 0.88–1.16)
LYMPHOCYTES # BLD AUTO: 1.55 K/UL — SIGNIFICANT CHANGE UP (ref 1–3.3)
LYMPHOCYTES # BLD AUTO: 18.6 % — SIGNIFICANT CHANGE UP (ref 13–44)
MCHC RBC-ENTMCNC: 30.4 PG — SIGNIFICANT CHANGE UP (ref 27–34)
MCHC RBC-ENTMCNC: 33.2 GM/DL — SIGNIFICANT CHANGE UP (ref 32–36)
MCV RBC AUTO: 91.4 FL — SIGNIFICANT CHANGE UP (ref 80–100)
MONOCYTES # BLD AUTO: 0.63 K/UL — SIGNIFICANT CHANGE UP (ref 0–0.9)
MONOCYTES NFR BLD AUTO: 7.6 % — SIGNIFICANT CHANGE UP (ref 2–14)
NEUTROPHILS # BLD AUTO: 5.89 K/UL — SIGNIFICANT CHANGE UP (ref 1.8–7.4)
NEUTROPHILS NFR BLD AUTO: 70.7 % — SIGNIFICANT CHANGE UP (ref 43–77)
PLATELET # BLD AUTO: 293 K/UL — SIGNIFICANT CHANGE UP (ref 150–400)
POTASSIUM SERPL-MCNC: 4.4 MMOL/L — SIGNIFICANT CHANGE UP (ref 3.5–5.3)
POTASSIUM SERPL-SCNC: 4.4 MMOL/L — SIGNIFICANT CHANGE UP (ref 3.5–5.3)
PROTHROM AB SERPL-ACNC: 11.9 SEC — SIGNIFICANT CHANGE UP (ref 10.5–13.4)
RBC # BLD: 3.72 M/UL — LOW (ref 4.2–5.8)
RBC # FLD: 13.8 % — SIGNIFICANT CHANGE UP (ref 10.3–14.5)
SARS-COV-2 RNA SPEC QL NAA+PROBE: SIGNIFICANT CHANGE UP
SODIUM SERPL-SCNC: 141 MMOL/L — SIGNIFICANT CHANGE UP (ref 135–145)
WBC # BLD: 8.33 K/UL — SIGNIFICANT CHANGE UP (ref 3.8–10.5)
WBC # FLD AUTO: 8.33 K/UL — SIGNIFICANT CHANGE UP (ref 3.8–10.5)

## 2023-03-28 PROCEDURE — 93010 ELECTROCARDIOGRAM REPORT: CPT

## 2023-03-28 NOTE — H&P PST ADULT - NSICDXPASTSURGICALHX_GEN_ALL_CORE_FT
PAST SURGICAL HISTORY:  S/P CABG x 4      PAST SURGICAL HISTORY:  Coronary stent patent     H/O right knee surgery     S/P CABG x 4

## 2023-03-28 NOTE — H&P PST ADULT - PROBLEM SELECTOR PLAN 2
Patient instructed to continue Plavix and ASA with no interruptions, verbalized understanding.  Cardiac clearance pending.

## 2023-03-28 NOTE — H&P PST ADULT - NSICDXPASTMEDICALHX_GEN_ALL_CORE_FT
PAST MEDICAL HISTORY:  Acute myocardial infarction     CAD (coronary artery disease)     HLD (hyperlipidemia)     HTN (hypertension)      PAST MEDICAL HISTORY:  Acute myocardial infarction     CAD (coronary artery disease)     History of blood transfusion     HLD (hyperlipidemia)     HTN (hypertension)

## 2023-03-28 NOTE — H&P PST ADULT - ASSESSMENT
CAPRINI SCORE    AGE RELATED RISK FACTORS                                                             [ ] Age 41-60 years                                            (1 Point)  [ ] Age: 61-74 years                                           (2 Points)                 [ ] Age= 75 years                                                (3 Points)             DISEASE RELATED RISK FACTORS                                                       [ ] Edema in the lower extremities                 (1 Point)                     [ ] Varicose veins                                               (1 Point)                                 [ ] BMI > 25 Kg/m2                                            (1 Point)                                  [ ] Serious infection (ie PNA)                            (1 Point)                     [ ] Lung disease ( COPD, Emphysema)            (1 Point)                                                                          [ ] Acute myocardial infarction                         (1 Point)                  [ ] Congestive heart failure (in the previous month)  (1 Point)         [ ] Inflammatory bowel disease                            (1 Point)                  [ ] Central venous access, PICC or Port               (2 points)       (within the last month)                                                                [ ] Stroke (in the previous month)                        (5 Points)    [ ] Previous or present malignancy                       (2 points)                                                                                                                                                         HEMATOLOGY RELATED FACTORS                                                         [ ] Prior episodes of VTE                                     (3 Points)                     [ ] Positive family history for VTE                      (3 Points)                  [ ] Prothrombin 82025 A                                     (3 Points)                     [ ] Factor V Leiden                                                (3 Points)                        [ ] Lupus anticoagulants                                      (3 Points)                                                           [ ] Anticardiolipin antibodies                              (3 Points)                                                       [ ] High homocysteine in the blood                   (3 Points)                                             [ ] Other congenital or acquired thrombophilia      (3 Points)                                                [ ] Heparin induced thrombocytopenia                  (3 Points)                                        MOBILITY RELATED FACTORS  [ ] Bed rest                                                         (1 Point)  [ ] Plaster cast                                                    (2 points)  [ ] Bed bound for more than 72 hours           (2 Points)    GENDER SPECIFIC FACTORS  [ ] Pregnancy or had a baby within the last month   (1 Point)  [ ] Post-partum < 6 weeks                                   (1 Point)  [ ] Hormonal therapy  or oral contraception   (1 Point)  [ ] History of pregnancy complications              (1 point)  [ ] Unexplained or recurrent              (1 Point)    OTHER RISK FACTORS                                           (1 Point)  [ ] BMI >40, smoking, diabetes requiring insulin, chemotherapy  blood transfusions and length of surgery over 2 hours    SURGERY RELATED RISK FACTORS  [ ]  Section within the last month     (1 Point)  [ ] Minor surgery                                                  (1 Point)  [ ] Arthroscopic surgery                                       (2 Points)  [ ] Planned major surgery lasting more            (2 Points)      than 45 minutes     [ ] Elective hip or knee joint replacement       (5 points)       surgery                                                TRAUMA RELATED RISK FACTORS  [ ] Fracture of the hip, pelvis, or leg                       (5 Points)  [ ] Spinal cord injury resulting in paralysis             (5 points)       (in the previous month)    [ ] Paralysis  (less than 1 month)                             (5 Points)  [ ] Multiple Trauma within 1 month                        (5 Points)    Total Score [        ]    Caprini Score 0-2: Low Risk, NO VTE prophylaxis required for most patients, encourage ambulation  Caprini Score 3-6: Moderate Risk , pharmacologic VTE prophylaxis is indicated for most patients (in the absence of contraindications)  Caprini Score Greater than or =7: High risk, pharmocologic VTE prophylaxis indicated for most patients (in the absence of contraindications)      OPIOID RISK TOOL    WALTER EACH BOX THAT APPLIES AND ADD TOTALS AT THE END    FAMILY HISTORY OF SUBSTANCE ABUSE                 FEMALE         MALE                                                Alcohol                             [  ]1 pt          [  ]3pts                                               Illegal Durgs                     [  ]2 pts        [  ]3pts                                               Rx Drugs                           [  ]4 pts        [  ]4 pts    PERSONAL HISTORY OF SUBSTANCE ABUSE                                                                                          Alcohol                             [  ]3 pts       [  ]3 pts                                               Illegal Drugs                     [  ]4 pts        [  ]4 pts                                               Rx Drugs                           [  ]5 pts        [  ]5 pts    AGE BETWEEN 16-45 YEARS                                      [  ]1 pt         [  ]1 pt    HISTORY OF PREADOLESCENT   SEXUAL ABUSE                                                             [  ]3 pts        [  ]0pts    PSYCHOLOGICAL DISEASE                     ADD, OCD, Bipolar, Schizophrenia        [  ]2 pts         [  ]2 pts                      Depression                                               [  ]1 pt           [  ]1 pt           SCORING TOTAL   (add numbers and type here)              (***)                                     A score of 3 or lower indicated LOW risk for future opioid abuse  A score of 4 to 7 indicated moderate risk for future opioid abuse  A score of 8 or higher indicates a high risk for opioid abuse                             Patient is a 73 year old  male presenting today for PST, PMH includes CAD, HTN. HLD and MI (s/p 1 stent). He is s/p CABG x4 on  2023. As per chart CT Angio performed on 2023 demonstrated 65% stenosis in the proximal left internal carotid artery. He is s/p CABG x 4.  Denies chest pain, palpitations, peripheral edema, dyspnea, dizziness or lightheadedness.  Patient is now scheduled for left carotid endarterectomy on 3/31/23 with Dr. Cooper.     MIGUEL ÁNGELI SCORE    AGE RELATED RISK FACTORS                                                             [ ] Age 41-60 years                                            (1 Point)  [X ] Age: 61-74 years                                           (2 Points)                 [ ] Age= 75 years                                                (3 Points)             DISEASE RELATED RISK FACTORS                                                       [ ] Edema in the lower extremities                 (1 Point)                     [ ] Varicose veins                                               (1 Point)                                 [X ] BMI > 25 Kg/m2                                            (1 Point)                                  [ ] Serious infection (ie PNA)                            (1 Point)                     [ ] Lung disease ( COPD, Emphysema)            (1 Point)                                                                          [ ] Acute myocardial infarction                         (1 Point)                  [ ] Congestive heart failure (in the previous month)  (1 Point)         [ ] Inflammatory bowel disease                            (1 Point)                  [ ] Central venous access, PICC or Port               (2 points)       (within the last month)                                                                [ ] Stroke (in the previous month)                        (5 Points)    [ ] Previous or present malignancy                       (2 points)                                                                                                                                                         HEMATOLOGY RELATED FACTORS                                                         [ ] Prior episodes of VTE                                     (3 Points)                     [ ] Positive family history for VTE                      (3 Points)                  [ ] Prothrombin 39870 A                                     (3 Points)                     [ ] Factor V Leiden                                                (3 Points)                        [ ] Lupus anticoagulants                                      (3 Points)                                                           [ ] Anticardiolipin antibodies                              (3 Points)                                                       [ ] High homocysteine in the blood                   (3 Points)                                             [ ] Other congenital or acquired thrombophilia      (3 Points)                                                [ ] Heparin induced thrombocytopenia                  (3 Points)                                        MOBILITY RELATED FACTORS  [ ] Bed rest                                                         (1 Point)  [ ] Plaster cast                                                    (2 points)  [ ] Bed bound for more than 72 hours           (2 Points)    GENDER SPECIFIC FACTORS  [ ] Pregnancy or had a baby within the last month   (1 Point)  [ ] Post-partum < 6 weeks                                   (1 Point)  [ ] Hormonal therapy  or oral contraception   (1 Point)  [ ] History of pregnancy complications              (1 point)  [ ] Unexplained or recurrent              (1 Point)    OTHER RISK FACTORS                                           (1 Point)  [X ] BMI >40, smoking, diabetes requiring insulin, chemotherapy  blood transfusions and length of surgery over 2 hours    SURGERY RELATED RISK FACTORS  [ ]  Section within the last month     (1 Point)  [ ] Minor surgery                                                  (1 Point)  [ ] Arthroscopic surgery                                       (2 Points)  [ X] Planned major surgery lasting more            (2 Points)      than 45 minutes     [ ] Elective hip or knee joint replacement       (5 points)       surgery                                                TRAUMA RELATED RISK FACTORS  [ ] Fracture of the hip, pelvis, or leg                       (5 Points)  [ ] Spinal cord injury resulting in paralysis             (5 points)       (in the previous month)    [ ] Paralysis  (less than 1 month)                             (5 Points)  [ ] Multiple Trauma within 1 month                        (5 Points)    Total Score [    6    ]    Caprini Score 0-2: Low Risk, NO VTE prophylaxis required for most patients, encourage ambulation  Caprini Score 3-6: Moderate Risk , pharmacologic VTE prophylaxis is indicated for most patients (in the absence of contraindications)  Caprini Score Greater than or =7: High risk, pharmocologic VTE prophylaxis indicated for most patients (in the absence of contraindications)      OPIOID RISK TOOL    WALTER EACH BOX THAT APPLIES AND ADD TOTALS AT THE END    FAMILY HISTORY OF SUBSTANCE ABUSE                 FEMALE         MALE                                                Alcohol                             [  ]1 pt          [  ]3pts                                               Illegal Durgs                     [  ]2 pts        [  ]3pts                                               Rx Drugs                           [  ]4 pts        [  ]4 pts    PERSONAL HISTORY OF SUBSTANCE ABUSE                                                                                          Alcohol                             [  ]3 pts       [  ]3 pts                                               Illegal Drugs                     [  ]4 pts        [  ]4 pts                                               Rx Drugs                           [  ]5 pts        [  ]5 pts    AGE BETWEEN 16-45 YEARS                                      [  ]1 pt         [  ]1 pt    HISTORY OF PREADOLESCENT   SEXUAL ABUSE                                                             [  ]3 pts        [  ]0pts    PSYCHOLOGICAL DISEASE                     ADD, OCD, Bipolar, Schizophrenia        [  ]2 pts         [  ]2 pts                      Depression                                               [  ]1 pt           [  ]1 pt           SCORING TOTAL   (add numbers and type here)              (*0)                                     A score of 3 or lower indicated LOW risk for future opioid abuse  A score of 4 to 7 indicated moderate risk for future opioid abuse  A score of 8 or higher indicates a high risk for opioid abuse

## 2023-03-28 NOTE — H&P PST ADULT - HISTORY OF PRESENT ILLNESS
Patient is a 73 year old  male presenting today for PST, PMH includes CAD, HTN. HLD and MI.  Patient is now scheduled for left carotid endarterectomy on 3/31/23 with Dr. Cooper.      Patient is a 73 year old  male presenting today for PST, PMH includes CAD, HTN. HLD and MI (s/p 1 stent). He is s/p CABG x 2/4/2023. Patient reported felling fatigued and short of breath. He was seen by his cardiologist for evaluation, work up results abnormal so he was taken to [facility] and admitted and CABG performed. During work-up he states carotids had a blockage. Reports symptoms have improved since his surgery. Denies chest pain, palpitations, peripheral edema, dyspnea, dizziness or lightheadedness.   Patient is now scheduled for left carotid endarterectomy on 3/31/23 with Dr. Cooper.      Patient is a 73 year old  male presenting today for PST, PMH includes CAD, HTN. HLD and MI (s/p 1 stent). He is s/p CABG x4 on  2/4/2023. Patient reported feeling fatigued and  short of breath, he was seen by his cardiologist for evaluation, work up results were abnormal so he was taken to [facility] and admitted. While admitted CABG x4  performed. During work-up he states, he was informed of carotids had a blockage. Reports symptoms have improved since his surgery. AS per chart CT Angio performed on 2/2/2023 demonstrated 65% stenosis in the proximal left internal carotid artery. Denies chest pain, palpitations, peripheral edema, dyspnea, dizziness or lightheadedness. Reports doing well since CABG surgery. Patient is now scheduled for left carotid endarterectomy on 3/31/23 with Dr. Cooper.      Patient is a 73 year old  male presenting today for PST, PMH includes CAD, HTN. HLD and MI (s/p 1 stent). He is s/p CABG x4 on  2/4/2023. Patient reported feeling fatigued and  short of breath, he was seen by his cardiologist for evaluation, work up results were abnormal so he was taken to [facility] and admitted. While admitted CABG x4  performed. During work-up he states, he was informed of carotids had a blockage. Reports symptoms have improved since his surgery. As per chart CT Angio performed on 2/2/2023 demonstrated 65% stenosis in the proximal left internal carotid artery. Denies chest pain, palpitations, peripheral edema, dyspnea, dizziness or lightheadedness. Reports doing well since CABG surgery. Patient is now scheduled for left carotid endarterectomy on 3/31/23 with Dr. Cooper pending medical and cardiac clearance.

## 2023-03-28 NOTE — H&P PST ADULT - PROBLEM SELECTOR PLAN 1
Labs, EKG and COVID perform Labs, EKG and COVID performed.  Scheduled for left carotid endarterectomy on 3/31/23 with Dr. Cooper.   Written and verbal instructions provided.  Patient educated on surgical scrub, preadmission instructions, clearance and day of procedure medications, verbalizes understanding.  Patient instructed to stop vitamins/supplements/herbal medications/NSAIDS for one week prior to surgery and discuss with PMD, verbalized understanding.  Patient instructed to continue Plavix and ASA with no interruptions, verbalized understanding.  Out patient medications reviewed with and verified with patient.  Patient verbalized understanding of instructions and was given the opportunity to ask questions and have them answered.

## 2023-03-28 NOTE — H&P PST ADULT - NSICDXFAMILYHX_GEN_ALL_CORE_FT
FAMILY HISTORY:  Father  Still living? Unknown  FH: Alzheimers disease, Age at diagnosis: Age Unknown    Mother  Still living? Unknown  FH: stroke, Age at diagnosis: Age Unknown    Sibling  Still living? Unknown  FH: bladder cancer, Age at diagnosis: Age Unknown

## 2023-03-28 NOTE — H&P PST ADULT - PROBLEM SELECTOR PLAN 3
BP today 92/60. Continue medication.   EKG performed.  Patient instructed to take BP medications with a sip of water day of surgery. verbalized understanding.  Cardiac clearance pending.

## 2023-03-28 NOTE — H&P PST ADULT - NEGATIVE ENMT SYMPTOMS
no ear pain/no tinnitus/no sinus symptoms/no nasal congestion/no post-nasal discharge/no nose bleeds/no throat pain/no dysphagia

## 2023-03-28 NOTE — H&P PST ADULT - NSANTHOSAYNRD_GEN_A_CORE
No. NOLA screening performed.  STOP BANG Legend: 0-2 = LOW Risk; 3-4 = INTERMEDIATE Risk; 5-8 = HIGH Risk

## 2023-03-29 PROBLEM — Z92.89 PERSONAL HISTORY OF OTHER MEDICAL TREATMENT: Chronic | Status: ACTIVE | Noted: 2023-03-28

## 2023-03-30 ENCOUNTER — TRANSCRIPTION ENCOUNTER (OUTPATIENT)
Age: 73
End: 2023-03-30

## 2023-03-30 NOTE — PATIENT PROFILE ADULT - FUNCTIONAL ASSESSMENT - BASIC MOBILITY 6.
3-calculated by average/Not able to assess (calculate score using [facility] averaging method)

## 2023-03-30 NOTE — PATIENT PROFILE ADULT - FALL HARM RISK - HARM RISK INTERVENTIONS

## 2023-03-31 ENCOUNTER — TRANSCRIPTION ENCOUNTER (OUTPATIENT)
Age: 73
End: 2023-03-31

## 2023-03-31 ENCOUNTER — APPOINTMENT (OUTPATIENT)
Dept: VASCULAR SURGERY | Facility: HOSPITAL | Age: 73
End: 2023-03-31

## 2023-03-31 ENCOUNTER — INPATIENT (INPATIENT)
Facility: HOSPITAL | Age: 73
LOS: 1 days | Discharge: HOME | DRG: 38 | End: 2023-04-02
Attending: STUDENT IN AN ORGANIZED HEALTH CARE EDUCATION/TRAINING PROGRAM | Admitting: STUDENT IN AN ORGANIZED HEALTH CARE EDUCATION/TRAINING PROGRAM
Payer: MEDICARE

## 2023-03-31 ENCOUNTER — RESULT REVIEW (OUTPATIENT)
Age: 73
End: 2023-03-31

## 2023-03-31 VITALS
HEIGHT: 67 IN | WEIGHT: 158.07 LBS | TEMPERATURE: 97 F | HEART RATE: 50 BPM | SYSTOLIC BLOOD PRESSURE: 111 MMHG | OXYGEN SATURATION: 97 % | DIASTOLIC BLOOD PRESSURE: 67 MMHG | RESPIRATION RATE: 16 BRPM

## 2023-03-31 DIAGNOSIS — Z98.890 OTHER SPECIFIED POSTPROCEDURAL STATES: Chronic | ICD-10-CM

## 2023-03-31 DIAGNOSIS — I65.21 OCCLUSION AND STENOSIS OF RIGHT CAROTID ARTERY: ICD-10-CM

## 2023-03-31 DIAGNOSIS — Z95.1 PRESENCE OF AORTOCORONARY BYPASS GRAFT: Chronic | ICD-10-CM

## 2023-03-31 DIAGNOSIS — I65.22 OCCLUSION AND STENOSIS OF LEFT CAROTID ARTERY: ICD-10-CM

## 2023-03-31 DIAGNOSIS — Z95.5 PRESENCE OF CORONARY ANGIOPLASTY IMPLANT AND GRAFT: Chronic | ICD-10-CM

## 2023-03-31 LAB — BLD GP AB SCN SERPL QL: SIGNIFICANT CHANGE UP

## 2023-03-31 PROCEDURE — 35301 RECHANNELING OF ARTERY: CPT | Mod: LT

## 2023-03-31 PROCEDURE — 88311 DECALCIFY TISSUE: CPT | Mod: 26

## 2023-03-31 PROCEDURE — 88304 TISSUE EXAM BY PATHOLOGIST: CPT | Mod: 26

## 2023-03-31 DEVICE — SURGIFOAM PAD 8CM X 12.5CM X 10MM (100): Type: IMPLANTABLE DEVICE | Site: LEFT | Status: FUNCTIONAL

## 2023-03-31 DEVICE — CLIP APPLIER ETHICON LIGACLIP 11.5" MEDIUM: Type: IMPLANTABLE DEVICE | Site: LEFT | Status: FUNCTIONAL

## 2023-03-31 DEVICE — PATCH PERICARDIAL PHOTOFIX .8X8CM: Type: IMPLANTABLE DEVICE | Site: LEFT | Status: FUNCTIONAL

## 2023-03-31 DEVICE — KIT A-LINE 1LUM 20G X 12CM SAFE KIT: Type: IMPLANTABLE DEVICE | Site: LEFT | Status: FUNCTIONAL

## 2023-03-31 DEVICE — ARISTA 3GR: Type: IMPLANTABLE DEVICE | Site: LEFT | Status: FUNCTIONAL

## 2023-03-31 DEVICE — CLIP APPLIER ETHICON LIGACLIP 9 3/8" SMALL: Type: IMPLANTABLE DEVICE | Site: LEFT | Status: FUNCTIONAL

## 2023-03-31 RX ORDER — METOPROLOL TARTRATE 50 MG
50 TABLET ORAL
Refills: 0 | Status: DISCONTINUED | OUTPATIENT
Start: 2023-03-31 | End: 2023-04-02

## 2023-03-31 RX ORDER — FENTANYL CITRATE 50 UG/ML
25 INJECTION INTRAVENOUS
Refills: 0 | Status: DISCONTINUED | OUTPATIENT
Start: 2023-03-31 | End: 2023-03-31

## 2023-03-31 RX ORDER — CEFAZOLIN SODIUM 1 G
2000 VIAL (EA) INJECTION ONCE
Refills: 0 | Status: DISCONTINUED | OUTPATIENT
Start: 2023-03-31 | End: 2023-03-31

## 2023-03-31 RX ORDER — ACETAMINOPHEN 500 MG
975 TABLET ORAL ONCE
Refills: 0 | Status: COMPLETED | OUTPATIENT
Start: 2023-03-31 | End: 2023-03-31

## 2023-03-31 RX ORDER — OXYCODONE HYDROCHLORIDE 5 MG/1
5 TABLET ORAL EVERY 6 HOURS
Refills: 0 | Status: DISCONTINUED | OUTPATIENT
Start: 2023-03-31 | End: 2023-04-02

## 2023-03-31 RX ORDER — SODIUM CHLORIDE 9 MG/ML
1000 INJECTION, SOLUTION INTRAVENOUS
Refills: 0 | Status: DISCONTINUED | OUTPATIENT
Start: 2023-03-31 | End: 2023-03-31

## 2023-03-31 RX ORDER — ALBUMIN HUMAN 25 %
250 VIAL (ML) INTRAVENOUS
Refills: 0 | Status: DISCONTINUED | OUTPATIENT
Start: 2023-03-31 | End: 2023-04-02

## 2023-03-31 RX ORDER — ASPIRIN/CALCIUM CARB/MAGNESIUM 324 MG
81 TABLET ORAL DAILY
Refills: 0 | Status: DISCONTINUED | OUTPATIENT
Start: 2023-03-31 | End: 2023-04-02

## 2023-03-31 RX ORDER — FENTANYL CITRATE 50 UG/ML
50 INJECTION INTRAVENOUS
Refills: 0 | Status: DISCONTINUED | OUTPATIENT
Start: 2023-03-31 | End: 2023-03-31

## 2023-03-31 RX ORDER — PHENYLEPHRINE HYDROCHLORIDE 10 MG/ML
0.5 INJECTION INTRAVENOUS
Qty: 40 | Refills: 0 | Status: DISCONTINUED | OUTPATIENT
Start: 2023-03-31 | End: 2023-03-31

## 2023-03-31 RX ORDER — SODIUM CHLORIDE 9 MG/ML
1000 INJECTION, SOLUTION INTRAVENOUS
Refills: 0 | Status: DISCONTINUED | OUTPATIENT
Start: 2023-03-31 | End: 2023-04-01

## 2023-03-31 RX ORDER — ATORVASTATIN CALCIUM 80 MG/1
80 TABLET, FILM COATED ORAL AT BEDTIME
Refills: 0 | Status: DISCONTINUED | OUTPATIENT
Start: 2023-03-31 | End: 2023-04-02

## 2023-03-31 RX ORDER — CLOPIDOGREL BISULFATE 75 MG/1
75 TABLET, FILM COATED ORAL DAILY
Refills: 0 | Status: DISCONTINUED | OUTPATIENT
Start: 2023-03-31 | End: 2023-04-02

## 2023-03-31 RX ORDER — SODIUM CHLORIDE 9 MG/ML
3 INJECTION INTRAMUSCULAR; INTRAVENOUS; SUBCUTANEOUS EVERY 8 HOURS
Refills: 0 | Status: DISCONTINUED | OUTPATIENT
Start: 2023-03-31 | End: 2023-03-31

## 2023-03-31 RX ORDER — ONDANSETRON 8 MG/1
4 TABLET, FILM COATED ORAL ONCE
Refills: 0 | Status: DISCONTINUED | OUTPATIENT
Start: 2023-03-31 | End: 2023-03-31

## 2023-03-31 RX ORDER — AMLODIPINE BESYLATE 2.5 MG/1
10 TABLET ORAL DAILY
Refills: 0 | Status: DISCONTINUED | OUTPATIENT
Start: 2023-03-31 | End: 2023-04-02

## 2023-03-31 RX ORDER — SENNA PLUS 8.6 MG/1
2 TABLET ORAL AT BEDTIME
Refills: 0 | Status: DISCONTINUED | OUTPATIENT
Start: 2023-03-31 | End: 2023-04-02

## 2023-03-31 RX ADMIN — Medication 975 MILLIGRAM(S): at 06:14

## 2023-03-31 RX ADMIN — SODIUM CHLORIDE 75 MILLILITER(S): 9 INJECTION, SOLUTION INTRAVENOUS at 22:40

## 2023-03-31 RX ADMIN — Medication 500 MILLILITER(S): at 17:19

## 2023-03-31 RX ADMIN — PHENYLEPHRINE HYDROCHLORIDE 13.4 MICROGRAM(S)/KG/MIN: 10 INJECTION INTRAVENOUS at 16:35

## 2023-03-31 RX ADMIN — ATORVASTATIN CALCIUM 80 MILLIGRAM(S): 80 TABLET, FILM COATED ORAL at 22:39

## 2023-04-01 ENCOUNTER — TRANSCRIPTION ENCOUNTER (OUTPATIENT)
Age: 73
End: 2023-04-01

## 2023-04-01 LAB
A1C WITH ESTIMATED AVERAGE GLUCOSE RESULT: 5.7 % — HIGH (ref 4–5.6)
ANION GAP SERPL CALC-SCNC: 12 MMOL/L — SIGNIFICANT CHANGE UP (ref 5–17)
BASOPHILS # BLD AUTO: 0.02 K/UL — SIGNIFICANT CHANGE UP (ref 0–0.2)
BASOPHILS NFR BLD AUTO: 0.2 % — SIGNIFICANT CHANGE UP (ref 0–2)
BUN SERPL-MCNC: 18.8 MG/DL — SIGNIFICANT CHANGE UP (ref 8–20)
CALCIUM SERPL-MCNC: 8.8 MG/DL — SIGNIFICANT CHANGE UP (ref 8.4–10.5)
CHLORIDE SERPL-SCNC: 108 MMOL/L — SIGNIFICANT CHANGE UP (ref 96–108)
CO2 SERPL-SCNC: 21 MMOL/L — LOW (ref 22–29)
CREAT SERPL-MCNC: 0.96 MG/DL — SIGNIFICANT CHANGE UP (ref 0.5–1.3)
EGFR: 84 ML/MIN/1.73M2 — SIGNIFICANT CHANGE UP
EOSINOPHIL # BLD AUTO: 0 K/UL — SIGNIFICANT CHANGE UP (ref 0–0.5)
EOSINOPHIL NFR BLD AUTO: 0 % — SIGNIFICANT CHANGE UP (ref 0–6)
ESTIMATED AVERAGE GLUCOSE: 117 MG/DL — HIGH (ref 68–114)
GLUCOSE SERPL-MCNC: 133 MG/DL — HIGH (ref 70–99)
HCT VFR BLD CALC: 27.5 % — LOW (ref 39–50)
HGB BLD-MCNC: 9.3 G/DL — LOW (ref 13–17)
IMM GRANULOCYTES NFR BLD AUTO: 0.5 % — SIGNIFICANT CHANGE UP (ref 0–0.9)
LYMPHOCYTES # BLD AUTO: 0.99 K/UL — LOW (ref 1–3.3)
LYMPHOCYTES # BLD AUTO: 10.2 % — LOW (ref 13–44)
MAGNESIUM SERPL-MCNC: 2.1 MG/DL — SIGNIFICANT CHANGE UP (ref 1.8–2.6)
MCHC RBC-ENTMCNC: 31.1 PG — SIGNIFICANT CHANGE UP (ref 27–34)
MCHC RBC-ENTMCNC: 33.8 GM/DL — SIGNIFICANT CHANGE UP (ref 32–36)
MCV RBC AUTO: 92 FL — SIGNIFICANT CHANGE UP (ref 80–100)
MONOCYTES # BLD AUTO: 0.65 K/UL — SIGNIFICANT CHANGE UP (ref 0–0.9)
MONOCYTES NFR BLD AUTO: 6.7 % — SIGNIFICANT CHANGE UP (ref 2–14)
NEUTROPHILS # BLD AUTO: 8 K/UL — HIGH (ref 1.8–7.4)
NEUTROPHILS NFR BLD AUTO: 82.4 % — HIGH (ref 43–77)
PHOSPHATE SERPL-MCNC: 2.6 MG/DL — SIGNIFICANT CHANGE UP (ref 2.4–4.7)
PLATELET # BLD AUTO: 230 K/UL — SIGNIFICANT CHANGE UP (ref 150–400)
POTASSIUM SERPL-MCNC: 4.1 MMOL/L — SIGNIFICANT CHANGE UP (ref 3.5–5.3)
POTASSIUM SERPL-SCNC: 4.1 MMOL/L — SIGNIFICANT CHANGE UP (ref 3.5–5.3)
RBC # BLD: 2.99 M/UL — LOW (ref 4.2–5.8)
RBC # FLD: 13.8 % — SIGNIFICANT CHANGE UP (ref 10.3–14.5)
SODIUM SERPL-SCNC: 141 MMOL/L — SIGNIFICANT CHANGE UP (ref 135–145)
WBC # BLD: 9.71 K/UL — SIGNIFICANT CHANGE UP (ref 3.8–10.5)
WBC # FLD AUTO: 9.71 K/UL — SIGNIFICANT CHANGE UP (ref 3.8–10.5)

## 2023-04-01 RX ORDER — DEXAMETHASONE 0.5 MG/5ML
10 ELIXIR ORAL ONCE
Refills: 0 | Status: COMPLETED | OUTPATIENT
Start: 2023-04-01 | End: 2023-04-01

## 2023-04-01 RX ORDER — DEXAMETHASONE 0.5 MG/5ML
6 ELIXIR ORAL
Refills: 0 | Status: DISCONTINUED | OUTPATIENT
Start: 2023-04-01 | End: 2023-04-01

## 2023-04-01 RX ORDER — POTASSIUM PHOSPHATE, MONOBASIC POTASSIUM PHOSPHATE, DIBASIC 236; 224 MG/ML; MG/ML
15 INJECTION, SOLUTION INTRAVENOUS ONCE
Refills: 0 | Status: COMPLETED | OUTPATIENT
Start: 2023-04-01 | End: 2023-04-01

## 2023-04-01 RX ORDER — DEXAMETHASONE 0.5 MG/5ML
6 ELIXIR ORAL
Refills: 0 | Status: DISCONTINUED | OUTPATIENT
Start: 2023-04-01 | End: 2023-04-02

## 2023-04-01 RX ADMIN — Medication 81 MILLIGRAM(S): at 11:42

## 2023-04-01 RX ADMIN — Medication 50 MILLIGRAM(S): at 05:26

## 2023-04-01 RX ADMIN — ATORVASTATIN CALCIUM 80 MILLIGRAM(S): 80 TABLET, FILM COATED ORAL at 21:40

## 2023-04-01 RX ADMIN — Medication 6 MILLIGRAM(S): at 11:41

## 2023-04-01 RX ADMIN — Medication 6 MILLIGRAM(S): at 23:58

## 2023-04-01 RX ADMIN — Medication 50 MILLIGRAM(S): at 17:17

## 2023-04-01 RX ADMIN — Medication 6 MILLIGRAM(S): at 17:17

## 2023-04-01 RX ADMIN — POTASSIUM PHOSPHATE, MONOBASIC POTASSIUM PHOSPHATE, DIBASIC 62.5 MILLIMOLE(S): 236; 224 INJECTION, SOLUTION INTRAVENOUS at 17:27

## 2023-04-01 RX ADMIN — AMLODIPINE BESYLATE 10 MILLIGRAM(S): 2.5 TABLET ORAL at 11:42

## 2023-04-01 RX ADMIN — CLOPIDOGREL BISULFATE 75 MILLIGRAM(S): 75 TABLET, FILM COATED ORAL at 11:42

## 2023-04-01 NOTE — DISCHARGE NOTE NURSING/CASE MANAGEMENT/SOCIAL WORK - PATIENT PORTAL LINK FT
You can access the FollowMyHealth Patient Portal offered by [facility] by registering at the following website: [email or web address]. By joining [employer]s FollowMyHealth portal, you will also be able to view your health information using other applications (apps) compatible with our system.

## 2023-04-02 ENCOUNTER — TRANSCRIPTION ENCOUNTER (OUTPATIENT)
Age: 73
End: 2023-04-02

## 2023-04-02 VITALS
HEART RATE: 67 BPM | TEMPERATURE: 98 F | SYSTOLIC BLOOD PRESSURE: 116 MMHG | OXYGEN SATURATION: 95 % | RESPIRATION RATE: 18 BRPM | DIASTOLIC BLOOD PRESSURE: 63 MMHG

## 2023-04-02 LAB
A1C WITH ESTIMATED AVERAGE GLUCOSE RESULT: 5.9 % — HIGH (ref 4–5.6)
ANION GAP SERPL CALC-SCNC: 11 MMOL/L — SIGNIFICANT CHANGE UP (ref 5–17)
BASOPHILS # BLD AUTO: 0.01 K/UL — SIGNIFICANT CHANGE UP (ref 0–0.2)
BASOPHILS NFR BLD AUTO: 0.1 % — SIGNIFICANT CHANGE UP (ref 0–2)
BUN SERPL-MCNC: 17.6 MG/DL — SIGNIFICANT CHANGE UP (ref 8–20)
CALCIUM SERPL-MCNC: 9 MG/DL — SIGNIFICANT CHANGE UP (ref 8.4–10.5)
CHLORIDE SERPL-SCNC: 109 MMOL/L — HIGH (ref 96–108)
CHOLEST SERPL-MCNC: 87 MG/DL — SIGNIFICANT CHANGE UP
CO2 SERPL-SCNC: 22 MMOL/L — SIGNIFICANT CHANGE UP (ref 22–29)
CREAT SERPL-MCNC: 0.86 MG/DL — SIGNIFICANT CHANGE UP (ref 0.5–1.3)
EGFR: 93 ML/MIN/1.73M2 — SIGNIFICANT CHANGE UP
EOSINOPHIL # BLD AUTO: 0 K/UL — SIGNIFICANT CHANGE UP (ref 0–0.5)
EOSINOPHIL NFR BLD AUTO: 0 % — SIGNIFICANT CHANGE UP (ref 0–6)
ESTIMATED AVERAGE GLUCOSE: 123 MG/DL — HIGH (ref 68–114)
GLUCOSE SERPL-MCNC: 152 MG/DL — HIGH (ref 70–99)
HCT VFR BLD CALC: 28.1 % — LOW (ref 39–50)
HDLC SERPL-MCNC: 42 MG/DL — SIGNIFICANT CHANGE UP
HGB BLD-MCNC: 9.4 G/DL — LOW (ref 13–17)
IMM GRANULOCYTES NFR BLD AUTO: 0.8 % — SIGNIFICANT CHANGE UP (ref 0–0.9)
LIPID PNL WITH DIRECT LDL SERPL: 37 MG/DL — SIGNIFICANT CHANGE UP
LYMPHOCYTES # BLD AUTO: 1.07 K/UL — SIGNIFICANT CHANGE UP (ref 1–3.3)
LYMPHOCYTES # BLD AUTO: 8.5 % — LOW (ref 13–44)
MAGNESIUM SERPL-MCNC: 2.1 MG/DL — SIGNIFICANT CHANGE UP (ref 1.6–2.6)
MCHC RBC-ENTMCNC: 30.9 PG — SIGNIFICANT CHANGE UP (ref 27–34)
MCHC RBC-ENTMCNC: 33.5 GM/DL — SIGNIFICANT CHANGE UP (ref 32–36)
MCV RBC AUTO: 92.4 FL — SIGNIFICANT CHANGE UP (ref 80–100)
MONOCYTES # BLD AUTO: 0.28 K/UL — SIGNIFICANT CHANGE UP (ref 0–0.9)
MONOCYTES NFR BLD AUTO: 2.2 % — SIGNIFICANT CHANGE UP (ref 2–14)
NEUTROPHILS # BLD AUTO: 11.12 K/UL — HIGH (ref 1.8–7.4)
NEUTROPHILS NFR BLD AUTO: 88.4 % — HIGH (ref 43–77)
NON HDL CHOLESTEROL: 45 MG/DL — SIGNIFICANT CHANGE UP
PHOSPHATE SERPL-MCNC: 2.6 MG/DL — SIGNIFICANT CHANGE UP (ref 2.4–4.7)
PLATELET # BLD AUTO: 271 K/UL — SIGNIFICANT CHANGE UP (ref 150–400)
POTASSIUM SERPL-MCNC: 4.1 MMOL/L — SIGNIFICANT CHANGE UP (ref 3.5–5.3)
POTASSIUM SERPL-SCNC: 4.1 MMOL/L — SIGNIFICANT CHANGE UP (ref 3.5–5.3)
RBC # BLD: 3.04 M/UL — LOW (ref 4.2–5.8)
RBC # FLD: 14.4 % — SIGNIFICANT CHANGE UP (ref 10.3–14.5)
SODIUM SERPL-SCNC: 142 MMOL/L — SIGNIFICANT CHANGE UP (ref 135–145)
TRIGL SERPL-MCNC: 42 MG/DL — SIGNIFICANT CHANGE UP
WBC # BLD: 12.58 K/UL — HIGH (ref 3.8–10.5)
WBC # FLD AUTO: 12.58 K/UL — HIGH (ref 3.8–10.5)

## 2023-04-02 RX ORDER — SODIUM,POTASSIUM PHOSPHATES 278-250MG
1 POWDER IN PACKET (EA) ORAL THREE TIMES A DAY
Refills: 0 | Status: DISCONTINUED | OUTPATIENT
Start: 2023-04-02 | End: 2023-04-02

## 2023-04-02 RX ADMIN — Medication 6 MILLIGRAM(S): at 06:42

## 2023-04-02 RX ADMIN — Medication 1 PACKET(S): at 11:40

## 2023-04-02 RX ADMIN — Medication 6 MILLIGRAM(S): at 11:38

## 2023-04-02 RX ADMIN — Medication 81 MILLIGRAM(S): at 11:38

## 2023-04-02 RX ADMIN — AMLODIPINE BESYLATE 10 MILLIGRAM(S): 2.5 TABLET ORAL at 06:42

## 2023-04-02 RX ADMIN — Medication 50 MILLIGRAM(S): at 06:42

## 2023-04-02 RX ADMIN — CLOPIDOGREL BISULFATE 75 MILLIGRAM(S): 75 TABLET, FILM COATED ORAL at 11:38

## 2023-04-02 NOTE — DISCHARGE NOTE PROVIDER - CARE PROVIDER_API CALL
Gigi Cooper)  Surgery Vascular  [address] [place] [id number]  Phone: [phone]  Fax: [phone]  Established Patient  Follow Up Time: 2 weeks

## 2023-04-02 NOTE — PROGRESS NOTE ADULT - ATTENDING COMMENTS
Agree with above. Doing well and tolerating diet. Steroids given for hypoglossal neuropraxia.  Patient willing to go home today. Will re evaluate after lunch.,
Doing well and tolerating PO.   Hypoglossal neuropraxia improved significantly.   DC Home.

## 2023-04-02 NOTE — PROGRESS NOTE ADULT - ASSESSMENT
73M s/p L CEA for carotid artery stenosis, tolerated procedure well however evidence postop of hypoglossal malfunction. 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
patient s/p L CEA for carotid artery stenosis, toelrated procedure well however evidence postop of hypoglosal malfunction. 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 rest of cranial nerves intact, carvalho tart dexamethasone continue obs    -- continue asa, plavix  -- dexamehtason  -- diet  -- bedrest for now, ambulate in AM  -- strinct I/O  - goals systolic >100
Patient is a 74 yo M who was admitted to vascular surgery service  for L CEA. Patient is POD 2. He is doing well. CN 2-12 intact.     Plan:   - c/w Aspirin and plavix  - pain control prn  - c/w home medications  - discharge home today with outpatient follow-up with Dr. Cooper

## 2023-04-02 NOTE — DISCHARGE NOTE PROVIDER - NSDCMRMEDTOKEN_GEN_ALL_CORE_FT
amLODIPine 10 mg oral tablet: 1 tab(s) orally once a day  aspirin 81 mg oral delayed release tablet: 1 tab(s) orally once a day  atorvastatin 80 mg oral tablet: 1 tab(s) orally once a day (at bedtime)  metoprolol tartrate 50 mg oral tablet: 1 tab(s) orally 2 times a day  oxyCODONE 5 mg oral tablet: 1 tab(s) orally every 6 hours, As Needed -Moderate Pain (4 - 6) MDD:4  Plavix 75 mg oral tablet: 1 tab(s) orally once a day  senna leaf extract oral tablet: 2 tab(s) orally once a day (at bedtime) as needed for constipation while taking narcotic pain medication

## 2023-04-02 NOTE — DISCHARGE NOTE PROVIDER - HOSPITAL COURSE
Patient is a 72yo M who was admitted to vascular surgery service for L carotid endarterectomy due to carotid stenosis on 3/31/2023. Patient tolerated procedure well, however he had tongue deviation in postoperative course. Steroids were given for hypoglossal neuropraxia, now patient improved and does not have tongue deviation. Patient has no complaints. Tolerating diet. hemodynamically stable, ambulating. Patient states that he is willing to go home. Patient is ready to be discharged home.

## 2023-04-02 NOTE — PROGRESS NOTE ADULT - SUBJECTIVE AND OBJECTIVE BOX
HPI/OVERNIGHT EVENTS: Patient seen and examined at bedside this AM. No overnight events. No complaints.  Denies fever, chills, nausea, vomiting, chest pain, SOB, dizziness, abd pain or any other concerning symptoms.    Vital Signs Last 24 Hrs  T(C): 36.5 (02 Apr 2023 09:02), Max: 36.7 (01 Apr 2023 23:20)  T(F): 97.7 (02 Apr 2023 09:02), Max: 98.1 (01 Apr 2023 23:20)  HR: 67 (02 Apr 2023 09:02) (67 - 78)  BP: 116/63 (02 Apr 2023 09:02) (109/62 - 128/61)  BP(mean): --  RR: 18 (02 Apr 2023 09:02) (18 - 18)  SpO2: 95% (02 Apr 2023 09:02) (94% - 95%)    Parameters below as of 02 Apr 2023 09:02  Patient On (Oxygen Delivery Method): room air        I&O's Detail    01 Apr 2023 07:01  -  02 Apr 2023 07:00  --------------------------------------------------------  IN:    IV PiggyBack: 186 mL    multiple electrolytes Injection Type 1.: 450 mL    Oral Fluid: 240 mL  Total IN: 876 mL    OUT:    Voided (mL): 775 mL  Total OUT: 775 mL    Total NET: 101 mL          Constitutional: patient resting comfortably in bed, in no acute distress  HEENT: EOMI, PERRLA, MMM.  Respiratory: Non labored breathing on RA  Cardiovascular: RRR  Gastrointestinal: Abdomen soft, non-tender, non-distended, no rebound tenderness / guarding  Musculoskeletal: No joint pain, swelling or deformity; no limitation of movement  Vascular: Extremities warm and well perfused.   Neuro: CN 2-12 grossly intact; no tongue deviation noted. Motor strength 5/5; sensations grossly intact.     LABS:                        9.4    12.58 )-----------( 271      ( 02 Apr 2023 04:40 )             28.1     04-02    142  |  109<H>  |  17.6  ----------------------------<  152<H>  4.1   |  22.0  |  0.86    Ca    9.0      02 Apr 2023 04:40  Phos  2.6     04-02  Mg     2.1     04-02            MEDICATIONS  (STANDING):  albumin human  5% IVPB 250 milliLiter(s) IV Intermittent every 30 minutes  amLODIPine   Tablet 10 milliGRAM(s) Oral daily  aspirin enteric coated 81 milliGRAM(s) Oral daily  atorvastatin 80 milliGRAM(s) Oral at bedtime  clopidogrel Tablet 75 milliGRAM(s) Oral daily  dexamethasone  Injectable for Oral Use 6 milliGRAM(s) Oral four times a day  metoprolol tartrate 50 milliGRAM(s) Oral two times a day  potassium phosphate / sodium phosphate Powder (PHOS-NaK) 1 Packet(s) Oral three times a day    MEDICATIONS  (PRN):  oxyCODONE    IR 5 milliGRAM(s) Oral every 6 hours PRN Moderate Pain (4 - 6)  senna 2 Tablet(s) Oral at bedtime PRN Constipation      MICRO:   Cultures     STUDIES:   EKG, CXR, U/S, CT, MRI   
INTERVAL HPI/OVERNIGHT EVENTS:    Patient evaluated at bedside. No acute distress. No acute events overnight.  Status post L CEA, some tongue deviation was noted post procedure, otherwise no neuro deficits. Passed bedside swallow eval and tolerating diet.      MEDICATIONS  (STANDING):  albumin human  5% IVPB 250 milliLiter(s) IV Intermittent every 30 minutes  amLODIPine   Tablet 10 milliGRAM(s) Oral daily  aspirin enteric coated 81 milliGRAM(s) Oral daily  atorvastatin 80 milliGRAM(s) Oral at bedtime  clopidogrel Tablet 75 milliGRAM(s) Oral daily  dexamethasone  Injectable for Oral Use 6 milliGRAM(s) Oral four times a day  metoprolol tartrate 50 milliGRAM(s) Oral two times a day  multiple electrolytes Injection Type 1 1000 milliLiter(s) (75 mL/Hr) IV Continuous <Continuous>  potassium phosphate IVPB 15 milliMole(s) IV Intermittent once    MEDICATIONS  (PRN):  oxyCODONE    IR 5 milliGRAM(s) Oral every 6 hours PRN Moderate Pain (4 - 6)  senna 2 Tablet(s) Oral at bedtime PRN Constipation      Vital Signs Last 24 Hrs  T(C): 36.5 (01 Apr 2023 11:17), Max: 36.7 (31 Mar 2023 16:25)  T(F): 97.7 (01 Apr 2023 11:17), Max: 98 (31 Mar 2023 16:25)  HR: 68 (01 Apr 2023 11:17) (61 - 75)  BP: 111/64 (01 Apr 2023 11:17) (96/53 - 131/57)  BP(mean): --  RR: 17 (01 Apr 2023 04:11) (11 - 19)  SpO2: 95% (01 Apr 2023 11:17) (93% - 99%)    Parameters below as of 01 Apr 2023 11:17  Patient On (Oxygen Delivery Method): room air        Constitutional: NAD  HEENT: PERRLA, EOMI, no drainage or redness  Neck: No bruits; no thyromegaly or nodules,  No JVD, L Surgical dressing with minimal strikethrough at the inferior aspect.  Minimal L sided neck fullness.    Back: Normal spine flexure, No CVA tenderness, No deformity or limitation of movement  Respiratory: Breath Sounds equal & clear to percussion & auscultation, no accessory muscle use  Cardiovascular: Regular rate & rhythm, normal S1, S2; no murmurs, gallops or rubs; no S3, S4  Gastrointestinal: Soft, non-tender, no hepatosplenomegaly, normal bowel sounds  Extremities: No peripheral edema, No cyanosis, clubbing   Vascular: Equal and normal pulses: 2+ peripheral pulses throughout  Neurological: GCS: (15). A&O x 3; no sensory, motor or coordination deficits, normal reflexes, Left sided tongue deviation noted.      I&O's Detail    31 Mar 2023 07:01  -  01 Apr 2023 07:00  --------------------------------------------------------  IN:    multiple electrolytes Injection Type 1.: 825 mL    Oral Fluid: 480 mL  Total IN: 1305 mL    OUT:    Indwelling Catheter - Urethral (mL): 1400 mL  Total OUT: 1400 mL    Total NET: -95 mL      01 Apr 2023 07:01  -  01 Apr 2023 13:30  --------------------------------------------------------  IN:  Total IN: 0 mL    OUT:    Voided (mL): 225 mL  Total OUT: 225 mL    Total NET: -225 mL    LABS:                        9.3    9.71  )-----------( 230      ( 01 Apr 2023 04:33 )             27.5     04-01    141  |  108  |  18.8  ----------------------------<  133<H>  4.1   |  21.0<L>  |  0.96    Ca    8.8      01 Apr 2023 04:33  Phos  2.6     04-01  Mg     2.1     04-01    RADIOLOGY & ADDITIONAL STUDIES:
Postoperative check    Patient seen and examined on room, patient is several hours s/p L CEA for Carotid artery stenosis. patient tolerated procedure well however there was evidence of tongue deviation at end of case, otherwise neurologic function intact. patient is off presosrs and pain is controlled, neck is soft and patient is not in distress.       Vital Signs Last 24 Hrs  T(C): 36.4 (31 Mar 2023 21:30), Max: 36.7 (31 Mar 2023 16:25)  T(F): 97.5 (31 Mar 2023 21:30), Max: 98 (31 Mar 2023 16:25)  HR: 69 (31 Mar 2023 21:30) (50 - 75)  BP: 96/53 (31 Mar 2023 21:30) (96/53 - 131/57)  BP(mean): --  RR: 16 (31 Mar 2023 21:30) (11 - 19)  SpO2: 97% (31 Mar 2023 21:30) (97% - 99%)    Parameters below as of 31 Mar 2023 21:30  Patient On (Oxygen Delivery Method): room air        I&O's Detail      Constitutional: patient resting comfortably in bed, in no acute distress  HEENT: EOMI, PERRLA, MMM. tongue deviation towards left, incisioon with dressing C/D/I  Respiratory: Non labored breathing on RA  Cardiovascular: RRR  Gastrointestinal: Abdomen soft, non-tender, non-distended, no rebound tenderness / guarding  Musculoskeletal: No joint pain, swelling or deformity; no limitation of movement  Vascular: Extremities warm and well perfused.                   MEDICATIONS  (STANDING):  albumin human  5% IVPB 250 milliLiter(s) IV Intermittent every 30 minutes  amLODIPine   Tablet 10 milliGRAM(s) Oral daily  aspirin enteric coated 81 milliGRAM(s) Oral daily  atorvastatin 80 milliGRAM(s) Oral at bedtime  clopidogrel Tablet 75 milliGRAM(s) Oral daily  metoprolol tartrate 50 milliGRAM(s) Oral two times a day  multiple electrolytes Injection Type 1 1000 milliLiter(s) (75 mL/Hr) IV Continuous <Continuous>    MEDICATIONS  (PRN):  oxyCODONE    IR 5 milliGRAM(s) Oral every 6 hours PRN Moderate Pain (4 - 6)  senna 2 Tablet(s) Oral at bedtime PRN Constipation

## 2023-04-02 NOTE — DISCHARGE NOTE PROVIDER - NSDCCPCAREPLAN_GEN_ALL_CORE_FT
PRINCIPAL DISCHARGE DIAGNOSIS  Diagnosis: Left carotid artery stenosis  Assessment and Plan of Treatment: Please call Dr. Cooper's office after discharge to schedule follow up appointment in 2 weeks.   Also call primary care provider and schedule a follow-up appointment for as soon as availalbe.   Upon discharge from the hospital you may resume your regular diet;  Please continue taking Aspirin and Plavix as directed.  You may shower, but do not submerge in bath at least 2 weeks.  You may drive whenevere you are off pain medications and are able to perform the activities needed to drive, turning, bending, twisting, etc.  Leave steri-strips in place as they will fall off in roughly ten days. As the steri-strips peel off, you may trim the edges.  Call the office or return to the ED if you experience any worsening pain, severe nausea, vomiting, fever >100.4, chest pain, sob, weakness, numbness or tingling.

## 2023-04-06 LAB — SURGICAL PATHOLOGY STUDY: SIGNIFICANT CHANGE UP

## 2023-04-13 ENCOUNTER — APPOINTMENT (OUTPATIENT)
Dept: VASCULAR SURGERY | Facility: CLINIC | Age: 73
End: 2023-04-13
Payer: MEDICARE

## 2023-04-13 VITALS
DIASTOLIC BLOOD PRESSURE: 62 MMHG | RESPIRATION RATE: 16 BRPM | WEIGHT: 159 LBS | HEART RATE: 58 BPM | SYSTOLIC BLOOD PRESSURE: 104 MMHG | OXYGEN SATURATION: 96 % | TEMPERATURE: 96.2 F | BODY MASS INDEX: 25.55 KG/M2 | HEIGHT: 66 IN

## 2023-04-13 PROCEDURE — 99024 POSTOP FOLLOW-UP VISIT: CPT

## 2023-04-17 ENCOUNTER — APPOINTMENT (OUTPATIENT)
Dept: OTOLARYNGOLOGY | Facility: CLINIC | Age: 73
End: 2023-04-17
Payer: MEDICARE

## 2023-04-17 VITALS
TEMPERATURE: 96.8 F | HEIGHT: 67 IN | WEIGHT: 157 LBS | SYSTOLIC BLOOD PRESSURE: 113 MMHG | BODY MASS INDEX: 24.64 KG/M2 | DIASTOLIC BLOOD PRESSURE: 57 MMHG | HEART RATE: 58 BPM

## 2023-04-17 DIAGNOSIS — G52.3 DISORDERS OF HYPOGLOSSAL NERVE: ICD-10-CM

## 2023-04-17 PROCEDURE — 99204 OFFICE O/P NEW MOD 45 MIN: CPT

## 2023-04-17 NOTE — PROCEDURE
[Gag Reflex] : gag reflex preventing mirror examination [None] : none [Flexible Endoscope] : examined with the flexible endoscope [Serial Number: ___] : Serial Number: [unfilled] [de-identified] : No lesions in the NPx, OPx, HPx or larynx.  VC are mobile, airway patent.\par

## 2023-04-17 NOTE — DISCUSSION/SUMMARY
[FreeTextEntry1] : 74yo male s/p left CEA complication by likely hypoglossal traction injury with tongue deviation and some mild difficulty with swallowing\par -recommend ENT evaluation to assess severity of injury and possibly provide management\par -continue BP control\par -will repeat duplex at 1 month post-op

## 2023-04-17 NOTE — PHYSICAL EXAM
[JVD] : no jugular venous distention  [Normal Breath Sounds] : Normal breath sounds [Normal Rate and Rhythm] : normal rate and rhythm [2+] : left 2+ [de-identified] : well appearing [de-identified] : left tongue deviation; inicision is c/d/i without swelling or palpable hematoma [de-identified] : 5/5 strength in all extremities

## 2023-04-17 NOTE — HISTORY OF PRESENT ILLNESS
[de-identified] : 72yo male s/p left carotid endarterectomy with complication by likely hypoglossal traction injury with tongue deviation and some mild difficulty with swallowing since  3/31/2023.  Pt is referred for evaluation to assess severity of injury and possibly provide management.  Pt c.o left side neck and left tongue numbness, b/l ears echo, no new hearing loss, and deviate tongue to the left and hard swallowing, speech change and chocking hazard. \par

## 2023-04-17 NOTE — PHYSICAL EXAM
[de-identified] : Neck incision healing well, no LAD. [Laryngoscopy Performed] : laryngoscopy was performed, see procedure section for findings [FreeTextEntry1] : Deviation of the tongue to the left without fasciculations, no atrophy.  No concerning lesions in the OC/OPx on inspection or palpation.\par  [Normal] : no rashes

## 2023-04-17 NOTE — REASON FOR VISIT
[de-identified] : Left Carotid Endarterectomy [de-identified] : 03/31/23 [de-identified] : Mr. Grier is here for follow up after left CEA he is overall doing well. He does still have tongue deviation and some difficulty swallowing and with speech. He has a mild hypoglossal symptomology likely from a traction injury. He completed a course of steroids postoperatively. He states the symptoms are stable but not improved. He denies any fluctuation in BP and denies headaches.

## 2023-04-20 PROCEDURE — C1769: CPT

## 2023-04-20 PROCEDURE — C1889: CPT

## 2023-04-20 PROCEDURE — G0463: CPT

## 2023-04-20 PROCEDURE — 85025 COMPLETE CBC W/AUTO DIFF WBC: CPT

## 2023-04-20 PROCEDURE — P9045: CPT

## 2023-04-20 PROCEDURE — 86923 COMPATIBILITY TEST ELECTRIC: CPT

## 2023-04-20 PROCEDURE — 93005 ELECTROCARDIOGRAM TRACING: CPT

## 2023-04-20 PROCEDURE — C9399: CPT

## 2023-04-20 PROCEDURE — 84100 ASSAY OF PHOSPHORUS: CPT

## 2023-04-20 PROCEDURE — 86850 RBC ANTIBODY SCREEN: CPT

## 2023-04-20 PROCEDURE — 83735 ASSAY OF MAGNESIUM: CPT

## 2023-04-20 PROCEDURE — 36415 COLL VENOUS BLD VENIPUNCTURE: CPT

## 2023-04-20 PROCEDURE — 88311 DECALCIFY TISSUE: CPT

## 2023-04-20 PROCEDURE — 88304 TISSUE EXAM BY PATHOLOGIST: CPT

## 2023-04-20 PROCEDURE — 80061 LIPID PANEL: CPT

## 2023-04-20 PROCEDURE — 80048 BASIC METABOLIC PNL TOTAL CA: CPT

## 2023-04-20 PROCEDURE — 86900 BLOOD TYPING SEROLOGIC ABO: CPT

## 2023-04-20 PROCEDURE — 83036 HEMOGLOBIN GLYCOSYLATED A1C: CPT

## 2023-04-20 PROCEDURE — 86901 BLOOD TYPING SEROLOGIC RH(D): CPT

## 2023-04-27 NOTE — HISTORY OF PRESENT ILLNESS
[FreeTextEntry1] : 74yo male with CAD and recent CABG found to have carotid stenosis in preoperative work up. Patient denies symptoms of unilateral vision loss, TIA and CVA; no episodes of weakness or slurred speech. He denies ever previously having any carotid imaging done. \par \par PMH: HTN, CAD, HLD\par PSH: CABG (02/2023)\par MEds: ASA 81, plavix, atorvastatin, amlodipine, metoprolol\par SocH: former smoker, occasional EtOH\par Allergies: NKDA

## 2023-04-27 NOTE — ASSESSMENT
[FreeTextEntry1] : 72yo male with >70% (PSV >230cm/s) stenosis of left internal carotid artery; patient is asymptomatic\par -Given severity of stenosis, evidence based medicine indication for intervention; according to \par -Explained risks (CVA, MI, cranial nerve injury and death) to patient and his wife and explained benefits (stroke risk reduction); all questions were answers and patient would like to proceed with intervention\par -will schedule for LEFT carotid endarterectomy; cardiac and medical risk stratification will be obtained\par -continue DAPT and statin

## 2023-04-27 NOTE — PHYSICAL EXAM
[Normal Breath Sounds] : Normal breath sounds [Normal Rate and Rhythm] : normal rate and rhythm [2+] : left 2+ [Alert] : alert [Oriented to Person] : oriented to person [Oriented to Place] : oriented to place [Oriented to Time] : oriented to time [JVD] : no jugular venous distention  [Ankle Swelling (On Exam)] : not present [Varicose Veins Of Lower Extremities] : not present [] : not present [de-identified] : wnl; no scars on neck [de-identified] : well appearing, ambulates unassisted [de-identified] : 5/5 motor in all muscle groups of all 4 extremities; sensation grossly intact

## 2023-04-27 NOTE — DATA REVIEWED
[FreeTextEntry1] : 02/01/2023\par \par Left ICA greater than 70% stenosis; velocities CCA: 96 cm/s; Prox ICA: 239cm/s; Distal ICA: 84cm/s\par Right ICA normal velocities

## 2023-05-03 ENCOUNTER — TRANSCRIPTION ENCOUNTER (OUTPATIENT)
Age: 73
End: 2023-05-03

## 2023-05-09 ENCOUNTER — APPOINTMENT (OUTPATIENT)
Dept: VASCULAR SURGERY | Facility: CLINIC | Age: 73
End: 2023-05-09

## 2023-05-11 ENCOUNTER — APPOINTMENT (OUTPATIENT)
Dept: OTOLARYNGOLOGY | Facility: CLINIC | Age: 73
End: 2023-05-11
Payer: MEDICARE

## 2023-05-11 PROCEDURE — 92522 EVALUATE SPEECH PRODUCTION: CPT | Mod: GN

## 2023-05-11 PROCEDURE — 92610 EVALUATE SWALLOWING FUNCTION: CPT | Mod: GN

## 2023-05-30 ENCOUNTER — APPOINTMENT (OUTPATIENT)
Dept: VASCULAR SURGERY | Facility: CLINIC | Age: 73
End: 2023-05-30

## 2023-07-17 ENCOUNTER — APPOINTMENT (OUTPATIENT)
Dept: OTOLARYNGOLOGY | Facility: CLINIC | Age: 73
End: 2023-07-17

## 2024-07-24 NOTE — ASU PATIENT PROFILE, ADULT - FALL HARM RISK - FALLEN IN PAST
Message from pt's MyChart. Provider now removed as PCP.  
LVM x1 and sent [place] message. Need to schedule fasting labs a couple days before physical (Any Same Day slot Ok per PCP)  
Patient is overdue for annual physical, med recheck and fasting labs  Please call to schedule for physical and previsit labs okay to use any available same-day/new patient slots on my schedule  Once done, please route back to me to put the lab orders-  30 DAYS SUPPLY SENT  
No

## (undated) DEVICE — WARMING BLANKET LOWER ADULT

## (undated) DEVICE — SUT PERMAHAND SILK 2 60" TIES

## (undated) DEVICE — SUT PROLENE 4-0 36" SH

## (undated) DEVICE — SUT PROLENE 7-0 24" BV175-6

## (undated) DEVICE — BEAVER BLADE MINI SHARP ALL ROUND (BLUE)

## (undated) DEVICE — SUT STAINLESS STEEL 5 18" SCC

## (undated) DEVICE — SET BLOOD Y-TYPE

## (undated) DEVICE — GLV 7.5 PROTEXIS (WHITE)

## (undated) DEVICE — SPONGE PEANUT AUTO COUNT

## (undated) DEVICE — STAPLER SKIN PROXIMATE

## (undated) DEVICE — DRSG MASTISOL

## (undated) DEVICE — PRESSURE INFUSOR BAG 1000ML

## (undated) DEVICE — NDL HYPO SAFE 18G X 1.5" (PINK)

## (undated) DEVICE — CHEST DRAIN PLEUR-EVAC DRY/WET ADULT-PEDS SINGLE (QUICK)

## (undated) DEVICE — BLADE SURGICAL #15 CARBON

## (undated) DEVICE — TUBING TRUWAVE PRESSURE MALE/FEMALE 72"

## (undated) DEVICE — DRAPE SPLIT SHEET 77" X 108"

## (undated) DEVICE — MULTIPLE PERFUSION SET FEMALE 1 INLET LEG W 4 LEGS 15" (BLUE/RED)

## (undated) DEVICE — DRSG OPSITE 13.75 X 4"

## (undated) DEVICE — SUT VICRYL 2-0 27" SH UNDYED

## (undated) DEVICE — SENSOR MYOCARDIAL TEMP 15MM

## (undated) DEVICE — SUT PROLENE 6-0 30" BV-1

## (undated) DEVICE — DRAPE IOBAN 33" X 23"

## (undated) DEVICE — GETINGE VASOVIEW 7 ENDOSCOPIC VESSEL HARVESTING SYSTEM

## (undated) DEVICE — STEALTH CLAMP INSERT FIBRA/FIBRA 60MM

## (undated) DEVICE — POSITIONER CARDIAC BUMP

## (undated) DEVICE — SUT SILK 4-0 30" TIES

## (undated) DEVICE — PACK VASCULAR

## (undated) DEVICE — DRSG CURITY GAUZE SPONGE 4 X 4" 12-PLY

## (undated) DEVICE — SUT NYLON 3-0 18" PS-2

## (undated) DEVICE — SUT PLEDGET 9MM X 4MM X 1.5MM

## (undated) DEVICE — TUBING MEDI-VAC W MAXIGRIP CONNECTORS 1/4"X6'

## (undated) DEVICE — SUT ETHIBOND 5 4-30" CCS

## (undated) DEVICE — TOURNIQUET SET TOURNIKWIK 12FR (4 TUBES, 1 SNARE) 7.5"

## (undated) DEVICE — BULLDOG SPRING CLIP 6MM SOFT/SOFT

## (undated) DEVICE — PREP SCRUB BRUSH W CHG 4%

## (undated) DEVICE — SUT VICRYL 2-0 27" CT-1 UNDYED

## (undated) DEVICE — SUT VICRYL 2 27" TP-1 UNDYED

## (undated) DEVICE — SUT VICRYL 3-0 27" SH

## (undated) DEVICE — SUT PROLENE 5-0 30" RB-2

## (undated) DEVICE — WARMING BLANKET DUO-THERM HYPER/HYPOTHERM ADULT

## (undated) DEVICE — VESSEL LOOP MAXI-RED  0.120" X 16"

## (undated) DEVICE — DRSG TAPE TRANSPORE 3"

## (undated) DEVICE — SOL ANTI FOG

## (undated) DEVICE — GLV 8.5 PROTEXIS (WHITE)

## (undated) DEVICE — DRSG STERISTRIPS 0.5 X 4"

## (undated) DEVICE — NDL COUNTER FOAM AND MAGNET 40-70

## (undated) DEVICE — PACING CABLE BI-V TEMP ALLIGATOR CLIP 12FT

## (undated) DEVICE — SUT PROLENE 7-0 30" BV-1

## (undated) DEVICE — VENODYNE/SCD SLEEVE CALF MEDIUM

## (undated) DEVICE — DRAPE THYROID 77" X 123"

## (undated) DEVICE — AORTIC PUNCH 5MM STANDARD HANDLE

## (undated) DEVICE — GOWN TRIMAX LG

## (undated) DEVICE — SYR LUER LOK 20CC

## (undated) DEVICE — SUT SILK 3-0 30" TIES

## (undated) DEVICE — VISITEC 4X4

## (undated) DEVICE — SUT BLUNT SZ 5

## (undated) DEVICE — GLV 7.5 SENSICARE W ALOE

## (undated) DEVICE — MEDICATION LABELS W MARKER

## (undated) DEVICE — GLV 7 PROTEXIS (WHITE)

## (undated) DEVICE — DRSG MEPILEX 10 X 10CM (4 X 4") AG

## (undated) DEVICE — SUT VICRYL 2-0 27" CT-2 UNDYED

## (undated) DEVICE — PRESSURE INFUSOR BAG 500ML

## (undated) DEVICE — SUT DOUBLE 6 WIRE STERNAL

## (undated) DEVICE — SYNOVIS VASCULAR PROBE 1.5MM 15CM

## (undated) DEVICE — SUT PROLENE 4-0 36" RB-1

## (undated) DEVICE — SUT MONOCRYL 4-0 27" PS-2 UNDYED

## (undated) DEVICE — SUT PROLENE 6-0 30" C-1

## (undated) DEVICE — SUT PDS II 2-0 27" CT-1

## (undated) DEVICE — TUBING ATS SUCTION LINE

## (undated) DEVICE — Device

## (undated) DEVICE — DRSG KLING 4"

## (undated) DEVICE — SAW BLADE STRYKER SAGITTAL SAFEDGE 40.5X47.5X0.64

## (undated) DEVICE — SUT HOLDER INSERT FOR OCTOBASE STERNAL RETRACTOR

## (undated) DEVICE — SOL INJ NS 0.9% 1000ML

## (undated) DEVICE — CATH IV SAFE BC 24G X 0.75" (YELLOW)

## (undated) DEVICE — SUT SILK 2-0 18" SH (POP-OFF)

## (undated) DEVICE — MAXI-FLO SUCTION CATHETER KIT 14FR STRAIGHT

## (undated) DEVICE — SUT PROLENE 3-0 36" SH

## (undated) DEVICE — SWITCH ARISS TABLE MOUNT UNEQUAL LEGS 13"

## (undated) DEVICE — GOWN XL W TOWEL

## (undated) DEVICE — SOL INJ NS 0.9% 500ML 1-PORT

## (undated) DEVICE — MEDTRONIC URCHIN EVO HEART POSITIONER & CANISTER TUBING SET

## (undated) DEVICE — DRSG TEGADERM 4X4.75"

## (undated) DEVICE — DRAPE TOWEL BLUE 17" X 24"

## (undated) DEVICE — TONGUE DEPRESSOR

## (undated) DEVICE — SUT VICRYL 0 36" CTX UNDYED

## (undated) DEVICE — RANGER BLOOD/FLUID WARMING SET DISP

## (undated) DEVICE — SUT SILK 0 30" TIES

## (undated) DEVICE — DRSG MEPILEX 10 X 30CM (4 X 12") AG

## (undated) DEVICE — DRAPE SLUSH / WARMER 44 X 66"

## (undated) DEVICE — GLV 6 PROTEXIS (WHITE)

## (undated) DEVICE — TUBING ALARIS PUMP MODULE NON-DEHP

## (undated) DEVICE — GLV 6.5 PROTEXIS (WHITE)

## (undated) DEVICE — SOL IRR POUR NS 0.9% 500ML

## (undated) DEVICE — STOPCOCK 3 WAY W SWIVEL MALE LUER LOCK

## (undated) DEVICE — PHRENIC NERVE PAD MEDIUM

## (undated) DEVICE — DRAPE TOWEL WHITE 17" X 24"

## (undated) DEVICE — VESSEL LOOP MINI-BLUE 0.075" X 16"

## (undated) DEVICE — NDL HYPO REGULAR BEVEL 25G X 1.5" (BLUE)

## (undated) DEVICE — GLV 8 PROTEXIS (WHITE)

## (undated) DEVICE — SUT PROLENE 3-0 36" MH

## (undated) DEVICE — TUBING SUCTION 20FT

## (undated) DEVICE — GLV 7 PROTEXIS (BLUE)

## (undated) DEVICE — SUT SILK 2-0 30" TIES

## (undated) DEVICE — DRSG OPSITE 2.5 X 2"

## (undated) DEVICE — CONNECTOR STRAIGHT 3/8 X 1/2"

## (undated) DEVICE — PREP CHLORAPREP HI-LITE ORANGE 26ML

## (undated) DEVICE — SUT PROLENE 5-0 36" RB-1

## (undated) DEVICE — URETERAL CATH RED RUBBER 20FR (YELLOW)

## (undated) DEVICE — SUT SILK 0 30" SH

## (undated) DEVICE — MARKING PEN W RULER

## (undated) DEVICE — BLOWER MISTER VIPER II

## (undated) DEVICE — ELCTR MULTIFUNCTION DEFIBRILLATION ELECTRODE EDGE SYSTEM ADULT

## (undated) DEVICE — MEDTRONIC CLEARVIEW BLOWER MISTER KIT W TUBING SET

## (undated) DEVICE — NDL BLUNT 18G LOOP VESSEL MAXI WHITE

## (undated) DEVICE — GLV 6.5 PROTEXIS (BLUE)

## (undated) DEVICE — ELCTR BOVIE PENCIL HANDPIECE ROCKER SWITCH 15FT

## (undated) DEVICE — AORTIC PUNCH 4.0MM STANDARD HANDLE

## (undated) DEVICE — LAP PAD 18 X 18"